# Patient Record
Sex: FEMALE | Race: WHITE | NOT HISPANIC OR LATINO | Employment: PART TIME | URBAN - METROPOLITAN AREA
[De-identification: names, ages, dates, MRNs, and addresses within clinical notes are randomized per-mention and may not be internally consistent; named-entity substitution may affect disease eponyms.]

---

## 2024-03-13 ENCOUNTER — APPOINTMENT (EMERGENCY)
Dept: RADIOLOGY | Facility: HOSPITAL | Age: 39
End: 2024-03-13
Payer: COMMERCIAL

## 2024-03-13 ENCOUNTER — HOSPITAL ENCOUNTER (OUTPATIENT)
Facility: HOSPITAL | Age: 39
Setting detail: OBSERVATION
Discharge: HOME | End: 2024-03-16
Attending: STUDENT IN AN ORGANIZED HEALTH CARE EDUCATION/TRAINING PROGRAM | Admitting: STUDENT IN AN ORGANIZED HEALTH CARE EDUCATION/TRAINING PROGRAM
Payer: COMMERCIAL

## 2024-03-13 ENCOUNTER — APPOINTMENT (EMERGENCY)
Dept: CT IMAGING | Facility: HOSPITAL | Age: 39
End: 2024-03-13
Payer: COMMERCIAL

## 2024-03-13 DIAGNOSIS — R56.9 SEIZURE-LIKE ACTIVITY (HCC): Primary | ICD-10-CM

## 2024-03-13 DIAGNOSIS — R55 SYNCOPE: ICD-10-CM

## 2024-03-13 PROBLEM — D72.829 LEUKOCYTOSIS: Status: ACTIVE | Noted: 2024-03-13

## 2024-03-13 PROBLEM — F32.A DEPRESSION: Chronic | Status: ACTIVE | Noted: 2024-03-13

## 2024-03-13 PROBLEM — E66.9 OBESITY: Chronic | Status: ACTIVE | Noted: 2024-03-13

## 2024-03-13 LAB
2HR DELTA HS TROPONIN: 11 NG/L
ALBUMIN SERPL BCP-MCNC: 4.1 G/DL (ref 3.5–5)
ALP SERPL-CCNC: 67 U/L (ref 34–104)
ALT SERPL W P-5'-P-CCNC: 20 U/L (ref 7–52)
ANION GAP SERPL CALCULATED.3IONS-SCNC: 9 MMOL/L (ref 4–13)
AST SERPL W P-5'-P-CCNC: 21 U/L (ref 13–39)
ATRIAL RATE: 113 BPM
BACTERIA UR QL AUTO: ABNORMAL /HPF
BASOPHILS # BLD AUTO: 0.06 THOUSANDS/ÂΜL (ref 0–0.1)
BASOPHILS NFR BLD AUTO: 1 % (ref 0–1)
BILIRUB SERPL-MCNC: 0.64 MG/DL (ref 0.2–1)
BILIRUB UR QL STRIP: NEGATIVE
BUN SERPL-MCNC: 14 MG/DL (ref 5–25)
CALCIUM SERPL-MCNC: 9 MG/DL (ref 8.4–10.2)
CARDIAC TROPONIN I PNL SERPL HS: 24 NG/L
CARDIAC TROPONIN I PNL SERPL HS: 35 NG/L
CHLORIDE SERPL-SCNC: 105 MMOL/L (ref 96–108)
CLARITY UR: CLEAR
CO2 SERPL-SCNC: 24 MMOL/L (ref 21–32)
COLOR UR: YELLOW
CREAT SERPL-MCNC: 0.89 MG/DL (ref 0.6–1.3)
D DIMER PPP FEU-MCNC: 0.41 UG/ML FEU
EOSINOPHIL # BLD AUTO: 0.22 THOUSAND/ÂΜL (ref 0–0.61)
EOSINOPHIL NFR BLD AUTO: 2 % (ref 0–6)
ERYTHROCYTE [DISTWIDTH] IN BLOOD BY AUTOMATED COUNT: 13.2 % (ref 11.6–15.1)
EXT PREGNANCY TEST URINE: NEGATIVE
FLUAV RNA RESP QL NAA+PROBE: NEGATIVE
FLUBV RNA RESP QL NAA+PROBE: NEGATIVE
GFR SERPL CREATININE-BSD FRML MDRD: 80 ML/MIN/1.73SQ M
GLUCOSE SERPL-MCNC: 105 MG/DL (ref 65–140)
GLUCOSE UR STRIP-MCNC: NEGATIVE MG/DL
HCT VFR BLD AUTO: 40.5 % (ref 34.8–46.1)
HGB BLD-MCNC: 13.6 G/DL (ref 11.5–15.4)
HGB UR QL STRIP.AUTO: NEGATIVE
HYALINE CASTS #/AREA URNS LPF: ABNORMAL /LPF
IMM GRANULOCYTES # BLD AUTO: 0.06 THOUSAND/UL (ref 0–0.2)
IMM GRANULOCYTES NFR BLD AUTO: 1 % (ref 0–2)
KETONES UR STRIP-MCNC: ABNORMAL MG/DL
LEUKOCYTE ESTERASE UR QL STRIP: NEGATIVE
LYMPHOCYTES # BLD AUTO: 2.12 THOUSANDS/ÂΜL (ref 0.6–4.47)
LYMPHOCYTES NFR BLD AUTO: 17 % (ref 14–44)
MCH RBC QN AUTO: 30.4 PG (ref 26.8–34.3)
MCHC RBC AUTO-ENTMCNC: 33.6 G/DL (ref 31.4–37.4)
MCV RBC AUTO: 91 FL (ref 82–98)
MONOCYTES # BLD AUTO: 0.86 THOUSAND/ÂΜL (ref 0.17–1.22)
MONOCYTES NFR BLD AUTO: 7 % (ref 4–12)
MUCOUS THREADS UR QL AUTO: ABNORMAL
NEUTROPHILS # BLD AUTO: 8.89 THOUSANDS/ÂΜL (ref 1.85–7.62)
NEUTS SEG NFR BLD AUTO: 72 % (ref 43–75)
NITRITE UR QL STRIP: NEGATIVE
NON-SQ EPI CELLS URNS QL MICRO: ABNORMAL /HPF
NRBC BLD AUTO-RTO: 0 /100 WBCS
P AXIS: 47 DEGREES
PH UR STRIP.AUTO: 5.5 [PH]
PLATELET # BLD AUTO: 304 THOUSANDS/UL (ref 149–390)
PMV BLD AUTO: 9.5 FL (ref 8.9–12.7)
POTASSIUM SERPL-SCNC: 3.8 MMOL/L (ref 3.5–5.3)
PR INTERVAL: 138 MS
PROT SERPL-MCNC: 7.5 G/DL (ref 6.4–8.4)
PROT UR STRIP-MCNC: ABNORMAL MG/DL
QRS AXIS: 61 DEGREES
QRSD INTERVAL: 78 MS
QT INTERVAL: 314 MS
QTC INTERVAL: 430 MS
RBC # BLD AUTO: 4.47 MILLION/UL (ref 3.81–5.12)
RBC #/AREA URNS AUTO: ABNORMAL /HPF
RSV RNA RESP QL NAA+PROBE: NEGATIVE
SARS-COV-2 RNA RESP QL NAA+PROBE: NEGATIVE
SODIUM SERPL-SCNC: 138 MMOL/L (ref 135–147)
SP GR UR STRIP.AUTO: 1.02 (ref 1–1.03)
T WAVE AXIS: 2 DEGREES
UROBILINOGEN UR STRIP-ACNC: <2 MG/DL
VENTRICULAR RATE: 113 BPM
WBC # BLD AUTO: 12.21 THOUSAND/UL (ref 4.31–10.16)
WBC #/AREA URNS AUTO: ABNORMAL /HPF

## 2024-03-13 PROCEDURE — 85025 COMPLETE CBC W/AUTO DIFF WBC: CPT

## 2024-03-13 PROCEDURE — 99285 EMERGENCY DEPT VISIT HI MDM: CPT | Performed by: STUDENT IN AN ORGANIZED HEALTH CARE EDUCATION/TRAINING PROGRAM

## 2024-03-13 PROCEDURE — 96360 HYDRATION IV INFUSION INIT: CPT

## 2024-03-13 PROCEDURE — 0241U HB NFCT DS VIR RESP RNA 4 TRGT: CPT

## 2024-03-13 PROCEDURE — 99285 EMERGENCY DEPT VISIT HI MDM: CPT

## 2024-03-13 PROCEDURE — 70450 CT HEAD/BRAIN W/O DYE: CPT

## 2024-03-13 PROCEDURE — 71045 X-RAY EXAM CHEST 1 VIEW: CPT

## 2024-03-13 PROCEDURE — 36415 COLL VENOUS BLD VENIPUNCTURE: CPT

## 2024-03-13 PROCEDURE — 81001 URINALYSIS AUTO W/SCOPE: CPT

## 2024-03-13 PROCEDURE — 93010 ELECTROCARDIOGRAM REPORT: CPT | Performed by: INTERNAL MEDICINE

## 2024-03-13 PROCEDURE — 80053 COMPREHEN METABOLIC PANEL: CPT

## 2024-03-13 PROCEDURE — 99222 1ST HOSP IP/OBS MODERATE 55: CPT | Performed by: STUDENT IN AN ORGANIZED HEALTH CARE EDUCATION/TRAINING PROGRAM

## 2024-03-13 PROCEDURE — 84484 ASSAY OF TROPONIN QUANT: CPT

## 2024-03-13 PROCEDURE — 85379 FIBRIN DEGRADATION QUANT: CPT

## 2024-03-13 PROCEDURE — 81025 URINE PREGNANCY TEST: CPT

## 2024-03-13 PROCEDURE — 93005 ELECTROCARDIOGRAM TRACING: CPT

## 2024-03-13 RX ORDER — ALPRAZOLAM 0.5 MG/1
0.5 TABLET ORAL
COMMUNITY

## 2024-03-13 RX ORDER — ACETAMINOPHEN 325 MG/1
650 TABLET ORAL EVERY 6 HOURS PRN
Status: DISCONTINUED | OUTPATIENT
Start: 2024-03-13 | End: 2024-03-16 | Stop reason: HOSPADM

## 2024-03-13 RX ORDER — SUMATRIPTAN 50 MG/1
50 TABLET, FILM COATED ORAL ONCE AS NEEDED
COMMUNITY

## 2024-03-13 RX ORDER — PANTOPRAZOLE SODIUM 40 MG/1
40 TABLET, DELAYED RELEASE ORAL
Status: DISCONTINUED | OUTPATIENT
Start: 2024-03-13 | End: 2024-03-16 | Stop reason: HOSPADM

## 2024-03-13 RX ORDER — LORAZEPAM 2 MG/ML
2 INJECTION INTRAMUSCULAR EVERY 6 HOURS PRN
Status: DISCONTINUED | OUTPATIENT
Start: 2024-03-13 | End: 2024-03-16 | Stop reason: HOSPADM

## 2024-03-13 RX ORDER — SUMATRIPTAN 25 MG/1
50 TABLET, FILM COATED ORAL ONCE AS NEEDED
Status: DISCONTINUED | OUTPATIENT
Start: 2024-03-13 | End: 2024-03-16 | Stop reason: HOSPADM

## 2024-03-13 RX ORDER — OMEPRAZOLE 20 MG/1
20 CAPSULE, DELAYED RELEASE ORAL
COMMUNITY

## 2024-03-13 RX ORDER — POLYETHYLENE GLYCOL 3350 17 G/17G
17 POWDER, FOR SOLUTION ORAL DAILY
Status: DISCONTINUED | OUTPATIENT
Start: 2024-03-14 | End: 2024-03-16 | Stop reason: HOSPADM

## 2024-03-13 RX ORDER — PANTOPRAZOLE SODIUM 40 MG/1
40 TABLET, DELAYED RELEASE ORAL
Status: DISCONTINUED | OUTPATIENT
Start: 2024-03-14 | End: 2024-03-13

## 2024-03-13 RX ORDER — ALPRAZOLAM 0.5 MG/1
0.5 TABLET ORAL
Status: DISCONTINUED | OUTPATIENT
Start: 2024-03-13 | End: 2024-03-16 | Stop reason: HOSPADM

## 2024-03-13 RX ORDER — ONDANSETRON 2 MG/ML
4 INJECTION INTRAMUSCULAR; INTRAVENOUS EVERY 6 HOURS PRN
Status: DISCONTINUED | OUTPATIENT
Start: 2024-03-13 | End: 2024-03-16 | Stop reason: HOSPADM

## 2024-03-13 RX ORDER — MOMETASONE FUROATE 1 MG/G
0.1 CREAM TOPICAL DAILY PRN
COMMUNITY

## 2024-03-13 RX ADMIN — SERTRALINE HYDROCHLORIDE 50 MG: 50 TABLET ORAL at 22:58

## 2024-03-13 RX ADMIN — SODIUM CHLORIDE 1000 ML: 0.9 INJECTION, SOLUTION INTRAVENOUS at 20:07

## 2024-03-13 RX ADMIN — ALPRAZOLAM 0.5 MG: 0.5 TABLET ORAL at 22:58

## 2024-03-13 NOTE — ED ATTENDING ATTESTATION
3/13/2024  I, Petey Faria DO, saw and evaluated the patient. I have discussed the patient with the resident/non-physician practitioner and agree with the resident's/non-physician practitioner's findings, Plan of Care, and MDM as documented in the resident's/non-physician practitioner's note, except where noted. All available labs and Radiology studies were reviewed.  I was present for key portions of any procedure(s) performed by the resident/non-physician practitioner and I was immediately available to provide assistance.       At this point I agree with the current assessment done in the Emergency Department.  I have conducted an independent evaluation of this patient a history and physical is as follows:    39-year-old female presents to the ED for evaluation of possible syncope versus seizure.  Patient states that last thing she remembers is that she was cooking food at home,  She remembers is police/EMS/family surrounding her and checking on her.  Family state that she had convulsive motions of her entire body lasting perhaps 2 minutes or so.  She denies previous episodes of similar symptoms.  No recent illness.  No recent changes in medications.  No known inciting event.  No falls or traumas.  Given description of symptoms, anticipate at the very least a ground-level fall.  On exam, is resting comfortably no acute distress, speaking full sentences with no respiratory distress, pulse is regular with normal rate, cranial nerves II through XII are grossly intact, moving all extremities with no gross motor deficit.  Is noted to be tachycardic on arrival.  EKG is nonischemic as interpreted by myself, appears to be a sinus rhythm.  Labs show leukocytosis 12.21, stable hemoglobin, no acute electrolyte abnormalities, normal renal function, no acute LFT abnormalities, negative D-dimer, indeterminate troponin at 24.  Chest x-ray shows no acute cardiopulmonary pathology as interpreted by myself pending final radiology  read.  CT head shows no acute intracranial pathology.  Patient was given fluid bolus.  Results discussed.  Given syncope versus seizure, plan will be for hospitalization.  Patient is agreeable to the plan.  Resident consulted the admitting team.  Accepted onto their service for further care and management.  Observation MedSurg with telemetry.  Stable at the time of disposition    ED Course         Critical Care Time  Procedures

## 2024-03-13 NOTE — LETTER
[facility] 3RD  FLOOR MED SURG UNIT  [address]  [place] [address]  Dept: [phone]    March 16, 2024     Patient: Nenita Lewis   YOB: 1985   Date of Visit: 3/13/2024       To Whom it May Concern:    Nenita Lewis is under my professional care. She was seen in the hospital from 3/13/2024 to 03/16/24. She may return to work on 3/20/2024 without limitations.    If you have any questions or concerns, please don't hesitate to call.         Sincerely,          Ruth Ayala MD

## 2024-03-13 NOTE — ED PROVIDER NOTES
History  Chief Complaint   Patient presents with    Syncope     Pt coming from home, had one syncopal episode in kitchen, unwitnessed. Son reported seizure like activity for about 2 minutes. No thinners/aspirin     The patient is a 39 year old female who presents to ED via EMS for evaluation of syncope vs seizure. The pt states she was making dinner tonight and then the next thing she remembers she was on the floor with her son over her. She states she currently feels mentally slow ad grodggy and has trouble remembering the day. The pt's sone was at home and states he was in the next room and heard his mom fall and he found her on the kitchen floor unconscious and shaking and drooling. He states the episode lasted about 2-3 minutes. The pt state        None       Past Medical History:   Diagnosis Date    Seizure (HCC)        Past Surgical History:   Procedure Laterality Date     SECTION         History reviewed. No pertinent family history.  I have reviewed and agree with the history as documented.    E-Cigarette/Vaping     E-Cigarette/Vaping Substances           Review of Systems   Constitutional:  Negative for activity change, appetite change, chills and fever.   HENT:  Negative for congestion and sore throat.    Eyes:  Negative for photophobia, discharge, redness and visual disturbance.   Respiratory:  Negative for chest tightness and shortness of breath.    Cardiovascular:  Negative for chest pain, palpitations and leg swelling.   Gastrointestinal:  Negative for abdominal pain, nausea and vomiting.   Genitourinary:  Negative for difficulty urinating, dysuria, flank pain, hematuria and urgency.   Musculoskeletal:  Negative for back pain.   Skin:  Negative for rash and wound.   Neurological:  Positive for tremors and syncope. Negative for dizziness, light-headedness and headaches.   Psychiatric/Behavioral:  Positive for confusion and decreased concentration.        Physical Exam  ED Triage Vitals    Temperature Pulse Respirations Blood Pressure SpO2   03/13/24 1918 03/13/24 1907 03/13/24 1907 03/13/24 1907 03/13/24 1907   98.5 °F (36.9 °C) (!) 118 20 133/84 93 %      Temp Source Heart Rate Source Patient Position - Orthostatic VS BP Location FiO2 (%)   03/13/24 1918 03/13/24 1907 03/13/24 1907 03/13/24 1907 --   Oral Monitor Sitting Left arm       Pain Score       --                    Orthostatic Vital Signs  Vitals:    03/13/24 1907   BP: 133/84   Pulse: (!) 118   Patient Position - Orthostatic VS: Sitting       Physical Exam  Vitals and nursing note reviewed.   Constitutional:       Appearance: Normal appearance. She is not ill-appearing.   HENT:      Head: Normocephalic and atraumatic.      Nose: Nose normal.      Mouth/Throat:      Mouth: Mucous membranes are moist.      Pharynx: Oropharynx is clear.   Eyes:      Pupils: Pupils are equal, round, and reactive to light.   Cardiovascular:      Rate and Rhythm: Regular rhythm. Tachycardia present.      Pulses: Normal pulses.      Heart sounds: Normal heart sounds.   Pulmonary:      Effort: Pulmonary effort is normal. No respiratory distress.      Breath sounds: Normal breath sounds. No wheezing, rhonchi or rales.   Abdominal:      General: Abdomen is flat. Bowel sounds are normal.      Palpations: Abdomen is soft.      Tenderness: There is no abdominal tenderness. There is no right CVA tenderness, left CVA tenderness, guarding or rebound.   Musculoskeletal:         General: No swelling, tenderness, deformity or signs of injury. Normal range of motion.      Cervical back: Normal range of motion and neck supple.      Right lower leg: No edema.      Left lower leg: No edema.   Skin:     General: Skin is warm and dry.      Findings: No erythema, lesion or rash.   Neurological:      General: No focal deficit present.      Mental Status: She is alert.      Cranial Nerves: No cranial nerve deficit.      Sensory: No sensory deficit.      Motor: No weakness.    Psychiatric:         Behavior: Behavior normal.         Judgment: Judgment normal.         ED Medications  Medications   sodium chloride 0.9 % bolus 1,000 mL (1,000 mL Intravenous New Bag 3/13/24 2007)       Diagnostic Studies  Results Reviewed       Procedure Component Value Units Date/Time    HS Troponin 0hr (reflex protocol) [[id number]]  (Normal) Collected: 03/13/24 2004    Lab Status: Final result Specimen: Blood from Arm, Right Updated: 03/13/24 2055     hs TnI 0hr 24 ng/L     HS Troponin I 2hr [[id number]]     Lab Status: No result Specimen: Blood     HS Troponin I 4hr [[id number]]     Lab Status: No result Specimen: Blood     Comprehensive metabolic panel [[id number]] Collected: 03/13/24 2004    Lab Status: Final result Specimen: Blood from Arm, Right Updated: 03/13/24 2053     Sodium 138 mmol/L      Potassium 3.8 mmol/L      Chloride 105 mmol/L      CO2 24 mmol/L      ANION GAP 9 mmol/L      BUN 14 mg/dL      Creatinine 0.89 mg/dL      Glucose 105 mg/dL      Calcium 9.0 mg/dL      AST 21 U/L      ALT 20 U/L      Alkaline Phosphatase 67 U/L      Total Protein 7.5 g/dL      Albumin 4.1 g/dL      Total Bilirubin 0.64 mg/dL      eGFR 80 ml/min/1.73sq m     Narrative:      National Kid[employer] Foundation guidelines for Chronic Kidney Disease (CKD):     Stage 1 with normal or high GFR (GFR > 90 mL/min/1.73 square meters)    Stage 2 Mild CKD (GFR = 60-89 mL/min/1.73 square meters)    Stage 3A Moderate CKD (GFR = 45-59 mL/min/1.73 square meters)    Stage 3B Moderate CKD (GFR = 30-44 mL/min/1.73 square meters)    Stage 4 Severe CKD (GFR = 15-29 mL/min/1.73 square meters)    Stage 5 End Stage CKD (GFR <15 mL/min/1.73 square meters)  Note: GFR calculation is accurate only with a steady state creatinine    D-Dimer [[id number]]  (Normal) Collected: 03/13/24 2004    Lab Status: Final result Specimen: Blood from Arm, Right Updated: 03/13/24 2048     D-Dimer, Quant 0.41 ug/ml FEU     CBC and differential [[id number]]   (Abnormal) Collected: 03/13/24 2004    Lab Status: Final result Specimen: Blood from Arm, Right Updated: 03/13/24 2045     WBC 12.21 Thousand/uL      RBC 4.47 Million/uL      Hemoglobin 13.6 g/dL      Hematocrit 40.5 %      MCV 91 fL      MCH 30.4 pg      MCHC 33.6 g/dL      RDW 13.2 %      MPV 9.5 fL      Platelets 304 Thousands/uL      nRBC 0 /100 WBCs      Neutrophils Relative 72 %      Immature Grans % 1 %      Lymphocytes Relative 17 %      Monocytes Relative 7 %      Eosinophils Relative 2 %      Basophils Relative 1 %      Neutrophils Absolute 8.89 Thousands/µL      Absolute Immature Grans 0.06 Thousand/uL      Absolute Lymphocytes 2.12 Thousands/µL      Absolute Monocytes 0.86 Thousand/µL      Eosinophils Absolute 0.22 Thousand/µL      Basophils Absolute 0.06 Thousands/µL     FLU/RSV/COVID - if FLU/RSV clinically relevant [[id number]] Collected: 03/13/24 2004    Lab Status: In process Specimen: Nares from Nose Updated: 03/13/24 2029    UA w Reflex to Microscopic w Reflex to Culture [[id number]]     Lab Status: No result Specimen: Urine     POCT pregnancy, urine [[id number]]     Lab Status: No result                    CT head without contrast   Final Result by Chao Street MD (03/13 2005)      No acute intracranial hemorrhage, mass effect or edema.                  Workstation performed: [id number]         XR chest 1 view portable    (Results Pending)         Procedures  ECG 12 Lead Documentation Only    Date/Time: 3/13/2024 7:19 PM    Performed by: Tricia Singer MD  Authorized by: Tricia Signer MD    ECG reviewed by me, the ED Provider: yes    Patient location:  ED  Previous ECG:     Previous ECG:  Unavailable  Interpretation:     Interpretation: abnormal    Rate:     ECG rate:  113    ECG rate assessment: tachycardic    Rhythm:     Rhythm: sinus tachycardia    Ectopy:     Ectopy: none    QRS:     QRS axis:  Normal    QRS intervals:  Normal  Conduction:     Conduction: normal    ST  segments:     ST segments:  Normal  T waves:     T waves: normal          ED Course  ED Course as of 03/13/24 2111   Wed Mar 13, 2024   2042 CT head without contrast  IMPRESSION:     No acute intracranial hemorrhage, mass effect or edema.   2042 Recheck of pt, she is resting in room with family and talking to mom on cell phone. Mom  reports he pt had seizures at age 1 and was on phenobarbitol from age 1-7. No sz since then.    2049 D-Dimer, Quant: 0.41  WNL   2051 CBC and differential(!)  Leukocytosis but otherwise grossly normal   2055 Comprehensive metabolic panel  Completely normal   2056 Pt updated on head ct and labs so far along with plan to admit for observation. She is agreeable to plan.                                        Medical Decision Making  Ddx: Syncope, seizure, electrolyte abnormality, dehydration, cardiac arrhythmia, brain aneurysm, brain tumor, PE    CT head negative  D-dimer negative  CXR unremarkable.  EKG with tachycardia.   Electrolytes WNL  Initial troponin at 24.   Mild leukocytosis on CBC  UA pending results.   Admit to [facility] service observation on telemetry in stable condition.     Amount and/or Complexity of Data Reviewed  Independent Historian:      Details: Son  Labs: ordered. Decision-making details documented in ED Course.  Radiology: ordered. Decision-making details documented in ED Course.          Disposition  Final diagnoses:   Seizure-like activity (HCC)   Syncope     Time reflects when diagnosis was documented in both MDM as applicable and the Disposition within this note       Time User Action Codes Description Comment    3/13/2024  8:01 PM Petey Faria Add [R56.9] Seizure-like activity (HCC)     3/13/2024  8:01 PM Petey Faria Add [R55] Syncope           ED Disposition       ED Disposition   Admit    Condition   Stable    Date/Time   Wed Mar 13, 2024  9:09 PM    Comment   Case was discussed with [facility] and the patient's admission status was agreed to be Admission Status:  observation status to the service of Dr. Doan .               Follow-up Information    None         Patient's Medications    No medications on file     No discharge procedures on file.    PDMP Review       None             ED Provider  Attending physically available and evaluated Nenita Lewis. I managed the patient along with the ED Attending.    Electronically Signed by           Tricia Singer MD  03/13/24 5707

## 2024-03-14 ENCOUNTER — APPOINTMENT (OUTPATIENT)
Dept: NON INVASIVE DIAGNOSTICS | Facility: HOSPITAL | Age: 39
End: 2024-03-14
Payer: COMMERCIAL

## 2024-03-14 PROBLEM — Z86.69 HX OF MIGRAINES: Status: ACTIVE | Noted: 2024-03-14

## 2024-03-14 LAB
4HR DELTA HS TROPONIN: 7 NG/L
AORTIC ROOT: 3.1 CM
APICAL FOUR CHAMBER EJECTION FRACTION: 56 %
ASCENDING AORTA: 3.4 CM
AV LVOT MEAN GRADIENT: 1 MMHG
AV LVOT PEAK GRADIENT: 3 MMHG
AV PEAK GRADIENT: 9 MMHG
BASOPHILS # BLD AUTO: 0.05 THOUSANDS/ÂΜL (ref 0–0.1)
BASOPHILS NFR BLD AUTO: 1 % (ref 0–1)
CARDIAC TROPONIN I PNL SERPL HS: 31 NG/L
DOP CALC LVOT PEAK VEL VTI: 18.19 CM
DOP CALC LVOT PEAK VEL: 0.82 M/S
E WAVE DECELERATION TIME: 190 MS
E/A RATIO: 1.59
EOSINOPHIL # BLD AUTO: 0.1 THOUSAND/ÂΜL (ref 0–0.61)
EOSINOPHIL NFR BLD AUTO: 1 % (ref 0–6)
ERYTHROCYTE [DISTWIDTH] IN BLOOD BY AUTOMATED COUNT: 13.5 % (ref 11.6–15.1)
FRACTIONAL SHORTENING: 29 (ref 28–44)
HCT VFR BLD AUTO: 37.6 % (ref 34.8–46.1)
HGB BLD-MCNC: 12.6 G/DL (ref 11.5–15.4)
IMM GRANULOCYTES # BLD AUTO: 0.02 THOUSAND/UL (ref 0–0.2)
IMM GRANULOCYTES NFR BLD AUTO: 0 % (ref 0–2)
INTERVENTRICULAR SEPTUM IN DIASTOLE (PARASTERNAL SHORT AXIS VIEW): 0.8 CM
INTERVENTRICULAR SEPTUM: 0.8 CM (ref 0.6–1.1)
LAAS-AP2: 13.5 CM2
LAAS-AP4: 12.2 CM2
LEFT ATRIUM AREA SYSTOLE SINGLE PLANE A4C: 12.6 CM2
LEFT ATRIUM SIZE: 3.4 CM
LEFT ATRIUM VOLUME (MOD BIPLANE): 32 ML
LEFT INTERNAL DIMENSION IN SYSTOLE: 3.7 CM (ref 2.1–4)
LEFT VENTRICULAR INTERNAL DIMENSION IN DIASTOLE: 5.2 CM (ref 3.5–6)
LEFT VENTRICULAR POSTERIOR WALL IN END DIASTOLE: 0.9 CM
LEFT VENTRICULAR STROKE VOLUME: 73 ML
LVSV (TEICH): 73 ML
LYMPHOCYTES # BLD AUTO: 3.18 THOUSANDS/ÂΜL (ref 0.6–4.47)
LYMPHOCYTES NFR BLD AUTO: 34 % (ref 14–44)
MCH RBC QN AUTO: 31 PG (ref 26.8–34.3)
MCHC RBC AUTO-ENTMCNC: 33.5 G/DL (ref 31.4–37.4)
MCV RBC AUTO: 93 FL (ref 82–98)
MONOCYTES # BLD AUTO: 0.82 THOUSAND/ÂΜL (ref 0.17–1.22)
MONOCYTES NFR BLD AUTO: 9 % (ref 4–12)
MV E'TISSUE VEL-SEP: 14 CM/S
MV PEAK A VEL: 0.59 M/S
MV PEAK E VEL: 94 CM/S
MV STENOSIS PRESSURE HALF TIME: 55 MS
MV VALVE AREA P 1/2 METHOD: 4
NEUTROPHILS # BLD AUTO: 5.24 THOUSANDS/ÂΜL (ref 1.85–7.62)
NEUTS SEG NFR BLD AUTO: 55 % (ref 43–75)
NRBC BLD AUTO-RTO: 0 /100 WBCS
PLATELET # BLD AUTO: 261 THOUSANDS/UL (ref 149–390)
PMV BLD AUTO: 9.1 FL (ref 8.9–12.7)
RBC # BLD AUTO: 4.06 MILLION/UL (ref 3.81–5.12)
RIGHT ATRIAL 2D VOLUME: 26 ML
RIGHT ATRIUM AREA SYSTOLE A4C: 11.3 CM2
RIGHT VENTRICLE ID DIMENSION: 3.3 CM
SL CV LEFT ATRIUM LENGTH A2C: 4.4 CM
SL CV LV EF: 55
SL CV PED ECHO LEFT VENTRICLE DIASTOLIC VOLUME (MOD BIPLANE) 2D: 131 ML
SL CV PED ECHO LEFT VENTRICLE SYSTOLIC VOLUME (MOD BIPLANE) 2D: 58 ML
TRICUSPID ANNULAR PLANE SYSTOLIC EXCURSION: 2.1 CM
WBC # BLD AUTO: 9.41 THOUSAND/UL (ref 4.31–10.16)

## 2024-03-14 PROCEDURE — 93306 TTE W/DOPPLER COMPLETE: CPT | Performed by: INTERNAL MEDICINE

## 2024-03-14 PROCEDURE — 36415 COLL VENOUS BLD VENIPUNCTURE: CPT | Performed by: STUDENT IN AN ORGANIZED HEALTH CARE EDUCATION/TRAINING PROGRAM

## 2024-03-14 PROCEDURE — 99245 OFF/OP CONSLTJ NEW/EST HI 55: CPT | Performed by: PSYCHIATRY & NEUROLOGY

## 2024-03-14 PROCEDURE — 84484 ASSAY OF TROPONIN QUANT: CPT | Performed by: STUDENT IN AN ORGANIZED HEALTH CARE EDUCATION/TRAINING PROGRAM

## 2024-03-14 PROCEDURE — 93306 TTE W/DOPPLER COMPLETE: CPT

## 2024-03-14 PROCEDURE — 85025 COMPLETE CBC W/AUTO DIFF WBC: CPT | Performed by: STUDENT IN AN ORGANIZED HEALTH CARE EDUCATION/TRAINING PROGRAM

## 2024-03-14 PROCEDURE — 99233 SBSQ HOSP IP/OBS HIGH 50: CPT | Performed by: INTERNAL MEDICINE

## 2024-03-14 RX ORDER — LORAZEPAM 2 MG/ML
1 INJECTION INTRAMUSCULAR
Status: COMPLETED | OUTPATIENT
Start: 2024-03-14 | End: 2024-03-15

## 2024-03-14 RX ADMIN — PANTOPRAZOLE SODIUM 40 MG: 40 TABLET, DELAYED RELEASE ORAL at 22:47

## 2024-03-14 RX ADMIN — ALPRAZOLAM 0.5 MG: 0.5 TABLET ORAL at 22:47

## 2024-03-14 RX ADMIN — SERTRALINE HYDROCHLORIDE 50 MG: 50 TABLET ORAL at 22:47

## 2024-03-14 RX ADMIN — ACETAMINOPHEN 650 MG: 325 TABLET, FILM COATED ORAL at 15:33

## 2024-03-14 RX ADMIN — ACETAMINOPHEN 650 MG: 325 TABLET, FILM COATED ORAL at 08:26

## 2024-03-14 RX ADMIN — IBUPROFEN 400 MG: 100 SUSPENSION ORAL at 22:46

## 2024-03-14 RX ADMIN — PANTOPRAZOLE SODIUM 40 MG: 40 TABLET, DELAYED RELEASE ORAL at 00:20

## 2024-03-14 RX ADMIN — ONDANSETRON 4 MG: 2 INJECTION INTRAMUSCULAR; INTRAVENOUS at 01:27

## 2024-03-14 NOTE — ASSESSMENT & PLAN NOTE
Resolved, unknown etiology.  Recent Labs     03/13/24 2004 03/14/24  0642   WBC 12.21* 9.41     Patient remains afebrile.   No signs of active infections noted on physical exam.   Will monitor.

## 2024-03-14 NOTE — CONSULTS
Consultation - Neurology   Nenita Lewis 39 y.o. female MRN: [id number]  Unit/Bed#: ED-02 Encounter: [id number]      Assessment/Plan     * Syncope  Assessment & Plan  38 y/o female with depression, who presented after having a syncopal episode vs seizure-like activity. Patient reportedly lost consciousness and does not recall the event. Son witnessed the event and reported that patient was laying on the floor with whole body shaking, eyes opening and closing, and foaming at the mouth. Patient recalls waking up with EMS at her home but reports she was confused and denies any prodromal symptoms, tongue bite, or incontinence. BP on presentation 133/84. Patient now back to baseline and exam non focal. Unclear etiology at this time but cannot fully rule out seizure. Work up as noted below.    Work up:  -CT head: No acute intracranial hemorrhage, mass effect or edema.   -Labs: WBC on presentation 12.21, UA negative    Plan:  -MRI brain w/wo contrast seizure protocol   -Ativan PRN for pre-medication due to claustrophobia  -Routine EEG  -No need for AED at this time  -Ativan PRN for seizure activity > 2 minutes  -Seizure precautions  -[employer] form completed and submitted online. Patient aware she should stop driving at this time.  -Monitor neuro exam; notify with any changes  -Medical management and supportive care per primary team. Correction of any metabolic or infectious disturbances.       Recommendations for outpatient neurological follow up have yet to be determined.    Case and treatment plan reviewed with attending neurologist, Dr. Shah. Please see attending attestation for any further recommendations.    History of Present Illness     Reason for Consult / Principal Problem: Syncope vs seizure  HPI: Nenita Lewis is a 39 y.o.  female with depression, who presents with syncope.    Patient seen and evaluated at the bedside with attending neurologist. Patient reports she was in the kitchen and standing, making dinner  for her son. The next thing she remembers is waking up with EMS there. Son witnessed the event and reported to patient that he heard a bang and then saw her laying on the kitchen floor with her body shaking and her eyes were opening and closing with foaming at the mouth. She denies prodromal symptoms, tongue bite, or urinary incontinence. She reports being confused afterwards, although she was able to remember that she was making dinner and to ask EMS to turn the oven off. She reports having difficulty getting answers for simple questions in ED. She denies any recent illnesses or significant alcohol use. She has not had any new med changes or tobacco use. She reports a history of febrile seizures as a child. She reports being back to baseline but has a mild headache currently.    Inpatient consult to Neurology  Consult performed by: HANH Hdez  Consult ordered by: Ruth Ayala MD          Review of Systems  A 12 system ROS was completed. Other than the above mentioned complaints in the HPI and those commented on below, all remaining systems were negative.    Historical Information   Past Medical History:   Diagnosis Date    Seizure (HCC)      Past Surgical History:   Procedure Laterality Date     SECTION       Social History   Social History     Substance and Sexual Activity   Alcohol Use None     Social History     Substance and Sexual Activity   Drug Use Not on file     E-Cigarette/Vaping     E-Cigarette/Vaping Substances     Social History     Tobacco Use   Smoking Status Not on file   Smokeless Tobacco Not on file     Family History: History reviewed. No pertinent family history.    Review of previous medical records was completed.     Meds/Allergies   all current active meds have been reviewed, current meds:   Current Facility-Administered Medications   Medication Dose Route Frequency    acetaminophen (TYLENOL) tablet 650 mg  650 mg Oral Q6H PRN    ALPRAZolam (XANAX) tablet 0.5 mg  0.5 mg  Oral HS    LORazepam (ATIVAN) injection 1 mg  1 mg Intravenous Once in imaging    LORazepam (ATIVAN) injection 1 mg  1 mg Intravenous Once in imaging    LORazepam (ATIVAN) injection 2 mg  2 mg Intravenous Q6H PRN    ondansetron (ZOFRAN) injection 4 mg  4 mg Intravenous Q6H PRN    pantoprazole (PROTONIX) EC tablet 40 mg  40 mg Oral HS    polyethylene glycol (MIRALAX) packet 17 g  17 g Oral Daily    sertraline (ZOLOFT) tablet 50 mg  50 mg Oral HS    SUMAtriptan (IMITREX) tablet 50 mg  50 mg Oral Once PRN   , and PTA meds:   Prior to Admission Medications   Prescriptions Last Dose Informant Patient Reported? Taking?   ALPRAZolam (Xanax) 0.5 mg tablet 3/12/2024  Yes Yes   Sig: Take 0.5 mg by mouth daily at bedtime   SUMAtriptan (IMITREX) 50 mg tablet Past Month  Yes Yes   Sig: Take 50 mg by mouth once as needed for migraine   mometasone (ELOCON) 0.1 % cream Past Month  Yes Yes   Sig: Apply 0.1 Applications topically daily as needed (for rash flare up)   omeprazole (PriLOSEC) 20 mg delayed release capsule 3/12/2024  Yes Yes   Sig: Take 20 mg by mouth daily at bedtime   sertraline (Zoloft) 50 mg tablet 3/12/2024  Yes Yes   Sig: Take 50 mg by mouth daily at bedtime      Facility-Administered Medications: None       Allergies   Allergen Reactions    Latex Rash       Objective   Vitals:Blood pressure 127/65, pulse 80, temperature 98.5 °F (36.9 °C), temperature source Oral, resp. rate 18, weight 103 kg (226 lb 10.1 oz), SpO2 97%.,There is no height or weight on file to calculate BMI.  No intake or output data in the 24 hours ending 03/14/24 1142    Invasive Devices:   Invasive Devices       Peripheral Intravenous Line  Duration             Peripheral IV 03/13/24 Left Antecubital <1 day                    Physical and neurologic exam performed by attending neurologist:  Physical Exam  Vitals and nursing note reviewed.   Constitutional:       General: She is not in acute distress.     Appearance: Normal appearance. She is not  ill-appearing.   HENT:      Head: Normocephalic.      Mouth/Throat:      Mouth: Mucous membranes are moist.      Pharynx: Oropharynx is clear.   Eyes:      General: No scleral icterus.        Right eye: No discharge.         Left eye: No discharge.      Extraocular Movements: Extraocular movements intact and EOM normal.      Conjunctiva/sclera: Conjunctivae normal.   Cardiovascular:      Rate and Rhythm: Normal rate.   Pulmonary:      Effort: Pulmonary effort is normal. No respiratory distress.   Musculoskeletal:         General: Normal range of motion.   Skin:     General: Skin is warm and dry.      Coloration: Skin is not jaundiced or pale.   Neurological:      Mental Status: She is alert and oriented to person, place, and time.      Coordination: Finger-Nose-Finger Test normal.      Deep Tendon Reflexes:      Reflex Scores:       Bicep reflexes are 2+ on the right side and 2+ on the left side.       Patellar reflexes are 2+ on the right side and 2+ on the left side.  Psychiatric:         Mood and Affect: Mood normal.         Behavior: Behavior normal.       Neurologic Exam     Mental Status   Oriented to person, place, and time.   Level of consciousness: alert  Able to follow commands appropriately. No aphasia or dysarthria noted.     Cranial Nerves     CN II   Right visual field deficit: none  Left visual field deficit: none     CN III, IV, VI   Extraocular motions are normal.     CN V   Facial sensation intact.     CN VII   Facial expression full, symmetric.     CN VIII   Hearing: intact    CN IX, X   Palate: symmetric    CN XII   CN XII normal.     Motor Exam   Muscle bulk: normal  Right arm pronator drift: absent  Left arm pronator drift: absent  Bilateral UE strength 5/5 deltoids, biceps, triceps, hand   Bilateral LE strength 5/5 hip flexion, knee flexion, knee extension, dorsiflexion, plantar flexion     Sensory Exam   Light touch normal.     Gait, Coordination, and Reflexes     Coordination   Finger to  "nose coordination: normal    Tremor   Resting tremor: absent  Intention tremor: absent    Reflexes   Right biceps: 2+  Left biceps: 2+  Right patellar: 2+  Left patellar: 2+      Lab Results: I have personally reviewed pertinent reports.  , CBC:   Results from last 7 days   Lab Units 03/14/24  0642 03/13/24 2004   WBC Thousand/uL 9.41 12.21*   RBC Million/uL 4.06 4.47   HEMOGLOBIN g/dL 12.6 13.6   HEMATOCRIT % 37.6 40.5   MCV fL 93 91   PLATELETS Thousands/uL 261 304   , BMP/CMP:   Results from last 7 days   Lab Units 03/13/24 2004   SODIUM mmol/L 138   POTASSIUM mmol/L 3.8   CHLORIDE mmol/L 105   CO2 mmol/L 24   BUN mg/dL 14   CREATININE mg/dL 0.89   CALCIUM mg/dL 9.0   AST U/L 21   ALT U/L 20   ALK PHOS U/L 67   EGFR ml/min/1.73sq m 80   , Vitamin B12:   , HgBA1C:   , TSH:   , Coagulation:   , Lipid Profile:   , Ammonia:   , Urinalysis:   Results from last 7 days   Lab Units 03/13/24  2203   COLOR UA  Yellow   CLARITY UA  Clear   SPEC GRAV UA  1.024   PH UA  5.5   LEUKOCYTES UA  Negative   NITRITE UA  Negative   GLUCOSE UA mg/dl Negative   KETONES UA mg/dl Trace*   BILIRUBIN UA  Negative   BLOOD UA  Negative   , Drug Screen:   , Medication Drug Levels:       Invalid input(s): \"CARBAMAZEPINE\", \"LACOSAMIDE\", \"OXCARBAZEPINE\"    Imaging Studies: I have personally reviewed pertinent reports.   and I have personally reviewed pertinent films in PACS  EKG, Pathology, and Other Studies: I have personally reviewed pertinent reports.      Code Status: Level 1 - Full Code  Advance Directive and Living Will:      Power of :    POLST:    "

## 2024-03-14 NOTE — PROGRESS NOTES
[facility]  Progress Note  Name: Nenita Lewis I  MRN: [id number]  Unit/Bed#: ED-02 I Date of Admission: 3/13/2024   Date of Service: 3/14/2024 I Hospital Day: 0    Assessment/Plan   * Syncope  Assessment & Plan  Likely orthostatic versus neurocardiogenic syncope, but cannot rule out seizure activity at this time.   Very less likely seizure disorder, despite witnessed body shakes.  No evidence of tongue bite, no evidence of bladder or bowel incontinence.  No history of seizure disorder  No prodromal symptoms.   CT head with no abnormality.  EKG with no evidence of ST-T wave ischemic changes, no bradycardia or tachyarrhythmia.  Orthostatic vitals negative  Patient currently at baseline on exam    Plan:  Echo, follow up  Consult neuro, appreciate recommendation  - Follow-up EEG and MRI  - [employer] form completed and submitted online       Hx of migraines  Assessment & Plan  Patient has a hx of migraines   - usually left-sided  No active migraine at this time.     Plan:  Continue Sumatriptan as needed    Depression  Assessment & Plan  Stable.   Continue with Zoloft.    Leukocytosis  Assessment & Plan  Resolved, unknown etiology.  Recent Labs     03/13/24  2004 03/14/24  0642   WBC 12.21* 9.41     Patient remains afebrile.   No signs of active infections noted on physical exam.   Will monitor.         VTE Pharmacologic Prophylaxis:   Low Risk (Score 0-2) - Encourage Ambulation.      Patient Centered Rounds: I performed bedside rounds with nursing staff today.  Discussions with Specialists or Other Care Team Provider: Case management, nursing, attending physician    Education and Discussions with Family / Patient: Attempted to update  (mother-in-law (Amelia)) via phone. Unable to contact.    Current Length of Stay: 0 day(s)  Current Patient Status: Observation   Discharge Plan: Anticipate discharge tomorrow to home.    Code Status: Level 1 - Full Code    Subjective:   40 yo female  sitting comfortably in bed. She is not in acute distress. No overnight events reported. She endorses that she does not fully remember the events of last night, but is feeling better today. She is alert and oriented today. No concerns at this time. She denies nausea, vomiting, palpitations, chest pain,  and shortness of breath. She does endorse a mild headache.    Objective:     Vitals:   Temp (24hrs), Av.5 °F (36.9 °C), Min:98.5 °F (36.9 °C), Max:98.5 °F (36.9 °C)    Temp:  [98.5 °F (36.9 °C)] 98.5 °F (36.9 °C)  HR:  [] 74  Resp:  [18-20] 18  BP: (107-155)/(56-88) 114/58  SpO2:  [93 %-98 %] 96 %  There is no height or weight on file to calculate BMI.     Input and Output Summary (last 24 hours):   No intake or output data in the 24 hours ending 24 1356    Physical Exam:   Physical Exam  Vitals reviewed.   Constitutional:       General: She is not in acute distress.     Appearance: Normal appearance. She is not ill-appearing.   HENT:      Head: Normocephalic and atraumatic.      Right Ear: External ear normal.      Left Ear: External ear normal.      Nose: Nose normal.      Mouth/Throat:      Mouth: Mucous membranes are moist.      Pharynx: Oropharynx is clear.   Eyes:      General:         Right eye: No discharge.         Left eye: No discharge.      Extraocular Movements: Extraocular movements intact.      Conjunctiva/sclera: Conjunctivae normal.      Pupils: Pupils are equal, round, and reactive to light.   Cardiovascular:      Rate and Rhythm: Normal rate and regular rhythm.      Pulses: Normal pulses.      Heart sounds: Normal heart sounds. No murmur heard.  Pulmonary:      Effort: Pulmonary effort is normal. No respiratory distress.      Breath sounds: Normal breath sounds. No wheezing, rhonchi or rales.   Abdominal:      Palpations: Abdomen is soft.      Tenderness: There is no abdominal tenderness.   Musculoskeletal:      Cervical back: Neck supple.      Right lower leg: No edema.      Left  lower leg: No edema.   Skin:     General: Skin is warm and dry.   Neurological:      General: No focal deficit present.      Mental Status: She is alert and oriented to person, place, and time.   Psychiatric:         Mood and Affect: Mood normal.         Behavior: Behavior normal.       Additional Data:     Labs:  Results from last 7 days   Lab Units 03/14/24  0642   WBC Thousand/uL 9.41   HEMOGLOBIN g/dL 12.6   HEMATOCRIT % 37.6   PLATELETS Thousands/uL 261   NEUTROS PCT % 55   LYMPHS PCT % 34   MONOS PCT % 9   EOS PCT % 1     Results from last 7 days   Lab Units 03/13/24 2004   SODIUM mmol/L 138   POTASSIUM mmol/L 3.8   CHLORIDE mmol/L 105   CO2 mmol/L 24   BUN mg/dL 14   CREATININE mg/dL 0.89   ANION GAP mmol/L 9   CALCIUM mg/dL 9.0   ALBUMIN g/dL 4.1   TOTAL BILIRUBIN mg/dL 0.64   ALK PHOS U/L 67   ALT U/L 20   AST U/L 21   GLUCOSE RANDOM mg/dL 105                     Lines/Drains:  Invasive Devices       Peripheral Intravenous Line  Duration             Peripheral IV 03/13/24 Left Antecubital <1 day                  Telemetry:  Telemetry Orders (From admission, onward)               24 Hour Telemetry Monitoring  Continuous x 24 Hours (Telem)        Question:  Reason for 24 Hour Telemetry  Answer:  Syncope suspected to be cardiac in origin                     Telemetry Reviewed: Normal Sinus Rhythm  Indication for Continued Telemetry Use: No indication for continued use. Will discontinue.          Imaging: Reviewed radiology reports from this admission including: CT head    Last 24 Hours Medication List:   Current Facility-Administered Medications   Medication Dose Route Frequency Provider Last Rate    acetaminophen  650 mg Oral Q6H PRN Lyndon Doan MD      ALPRAZolam  0.5 mg Oral HS Lyndon Doan MD      LORazepam  1 mg Intravenous Once in imaging An K Long, CRNP      LORazepam  1 mg Intravenous Once in imaging An K Long, CRNP      LORazepam  2 mg Intravenous Q6H PRN Lyndon Doan MD      ondansetron  4 mg  Intravenous Q6H PRN Lyndon Doan MD      pantoprazole  40 mg Oral HS Lyndon Doan MD      polyethylene glycol  17 g Oral Daily Lyndon Doan MD      sertraline  50 mg Oral HS Lyndon Doan MD      SUMAtriptan  50 mg Oral Once PRN Lyndon Doan MD          Today, Patient Was Seen By: Susana Moran DO    **Please Note: This note may have been constructed using a voice recognition system.**

## 2024-03-14 NOTE — ASSESSMENT & PLAN NOTE
Likely orthostatic versus neurocardiogenic syncope, but cannot rule out seizure activity at this time.   Very less likely seizure disorder, despite witnessed body shakes.  No evidence of tongue bite, no evidence of bladder or bowel incontinence.  No history of seizure disorder  No prodromal symptoms.   CT head with no abnormality.  EKG with no evidence of ST-T wave ischemic changes, no bradycardia or tachyarrhythmia.  Orthostatic vitals negative  Patient currently at baseline on exam    Plan:  Echo, follow up  Consult neuro, appreciate recommendation  - Follow-up EEG and MRI  - [employer] form completed and submitted online

## 2024-03-14 NOTE — ASSESSMENT & PLAN NOTE
38 y/o female with depression, who presented after having a syncopal episode vs seizure-like activity. Patient reportedly lost consciousness and does not recall the event. Son witnessed the event and reported that patient was laying on the floor with whole body shaking, eyes opening and closing, and foaming at the mouth. Patient recalls waking up with EMS at her home but reports she was confused and denies any prodromal symptoms, tongue bite, or incontinence. BP on presentation 133/84. Patient now back to baseline and exam non focal. Unclear etiology at this time but cannot fully rule out seizure. Work up as noted below.    Work up:  -CT head: No acute intracranial hemorrhage, mass effect or edema.   -Labs: WBC on presentation 12.21, UA negative    Plan:  -MRI brain w/wo contrast seizure protocol   -Ativan PRN for pre-medication due to claustrophobia  -Routine EEG  -No need for AED at this time  -Ativan PRN for seizure activity > 2 minutes  -Seizure precautions  -[employer] form completed and submitted online. Patient aware she should stop driving at this time.  -Monitor neuro exam; notify with any changes  -Medical management and supportive care per primary team. Correction of any metabolic or infectious disturbances.

## 2024-03-14 NOTE — UTILIZATION REVIEW
Initial Clinical Review    Admission: Date/Time/Statement:   Admission Orders (From admission, onward)       Ordered        03/13/24 2109  Place in Observation  Once                          Orders Placed This Encounter   Procedures    Place in Observation     Standing Status:   Standing     Number of Occurrences:   1     Order Specific Question:   Level of Care     Answer:   Med Surg [16]     Order Specific Question:   Bed request comments     Answer:   tele     ED Arrival Information       Expected   -    Arrival   3/13/2024 19:01    Acuity   Urgent              Means of arrival   Ambulance    Escorted by   [facility]    Service   Hospitalist    Admission type   Emergency              Arrival complaint   syncope             Chief Complaint   Patient presents with    Syncope     Pt coming from home, had one syncopal episode in kitchen, unwitnessed. Son reported seizure like activity for about 2 minutes. No thinners/aspirin       Initial Presentation: 39 y.o. female to ED by EMS as Observation admission due to Syncope. Presents  after her son saw her unconscious on the kitchen floor with abnormal movements and of frothy secretions from her mouth.  The patient does not remember any prodromal symptoms preceding her syncope.  She denied any blurred vision, double vision, palpitation, chest pain, dizziness, lightheadedness,  any tongue biting, bowel or bladder incontinence. No  history of adulthood seizure, she has a history of febrile seizure when she was a toddler, she denied any previous episode of syncope.  She was preparing dinner in the kitchen and the episode happened. After she gained her consciousness she was immediately aware of situation she knew where she was,, she had slight problem calling her son with his name, however she had normal sensorium.  EXAM CTH wo abnormality, leukocytosis  Cont tele, orthostatic vitals, no need for EEG, consult Neuro   Date: 3/14/2024   Day 2:   NEURO  Can not entirely rule  out a seizure, however overall presentation does seem suspicious of seizure, recommend MRI brain w/wo, and ReeG. D/w patient Arthur Dot and driving cessation. At this point no role to start of AEDs. Cont neuro checks, Ativan PRN for seizure activity > 2 minutes. Seizure precautions  DATE  3/15/2024  DAY 3  Cardiology  Sudden, without warning, no prodrome  Febrile seizures as a child  Prolonged minutes of witnessed tonic-clonic movement  Postictal state lasting until EMS arrived  Highly suggestive of seizure    ED Triage Vitals   Temperature Pulse Respirations Blood Pressure SpO2   03/13/24 1918 03/13/24 1907 03/13/24 1907 03/13/24 1907 03/13/24 1907   98.5 °F (36.9 °C) (!) 118 20 133/84 93 %      Temp Source Heart Rate Source Patient Position - Orthostatic VS BP Location FiO2 (%)   03/13/24 1918 03/13/24 1907 03/13/24 1907 03/13/24 1907 --   Oral Monitor Sitting Left arm       Pain Score       --                 Wt Readings from Last 1 Encounters:   03/13/24 103 kg (226 lb 10.1 oz)     Additional Vital Signs:   Date/Time Temp Pulse Resp BP MAP (mmHg) SpO2 O2 Device Patient Position - Orthostatic VS   03/14/24 0700 -- 66 18 107/56 74 98 % None (Room air) Lying   03/14/24 0400 -- 75 18 130/66 90 95 % None (Room air) Lying   03/14/24 0130 -- 81 -- 123/68 -- -- -- --   03/13/24 2232 -- 102 20 155/70 -- -- -- Standing for 3 minutes - Orthostatic VS   03/13/24 2229 -- 102 20 151/88 -- -- -- Standing - Orthostatic VS   03/13/24 2227 -- 101 20 141/69 -- -- -- Sitting - Orthostatic VS   03/13/24 2224 -- 104 20 141/74 -- -- -- Lying - Orthostatic VS   03/13/24 2004 -- -- -- -- -- -- None (Room air) --   03/13/24 1918 98.5 °F (36.9 °C) -- -- -- -- -- -- --   03/13/24 1907 -- 118 Abnormal  20 133/84 104 93 % None (Room air) Sitting     Weights (last 14 days)    Date/Time Weight Weight Method   03/13/24 1907 103 kg (226 lb 10.1 oz) Bed scale     Pertinent Labs/Diagnostic Test Results:   CT head without contrast   Final Result by  "Chao Street MD (03/13 2005)      No acute intracranial hemorrhage, mass effect or edema.                  Workstation performed: [id number]         XR chest 1 view portable   Final Result by Piedad Escamilla MD (03/13 2138)      No acute cardiopulmonary disease.            Workstation performed: [id number]           Results from last 7 days   Lab Units 03/13/24 2004   SARS-COV-2  Negative     Results from last 7 days   Lab Units 03/14/24  0642 03/13/24 2004   WBC Thousand/uL 9.41 12.21*   HEMOGLOBIN g/dL 12.6 13.6   HEMATOCRIT % 37.6 40.5   PLATELETS Thousands/uL 261 304   NEUTROS ABS Thousands/µL 5.24 8.89*         Results from last 7 days   Lab Units 03/13/24 2004   SODIUM mmol/L 138   POTASSIUM mmol/L 3.8   CHLORIDE mmol/L 105   CO2 mmol/L 24   ANION GAP mmol/L 9   BUN mg/dL 14   CREATININE mg/dL 0.89   EGFR ml/min/1.73sq m 80   CALCIUM mg/dL 9.0     Results from last 7 days   Lab Units 03/13/24 2004   AST U/L 21   ALT U/L 20   ALK PHOS U/L 67   TOTAL PROTEIN g/dL 7.5   ALBUMIN g/dL 4.1   TOTAL BILIRUBIN mg/dL 0.64         Results from last 7 days   Lab Units 03/13/24 2004   GLUCOSE RANDOM mg/dL 105             No results found for: \"BETA-HYDROXYBUTYRATE\"                   Results from last 7 days   Lab Units 03/14/24  0020 03/13/24  2203 03/13/24 2004   HS TNI 0HR ng/L  --   --  24   HS TNI 2HR ng/L  --  35  --    HSTNI D2 ng/L  --  11  --    HS TNI 4HR ng/L 31  --   --    HSTNI D4 ng/L 7  --   --      Results from last 7 days   Lab Units 03/13/24 2004   D-DIMER QUANTITATIVE ug/ml FEU 0.41                                                                 Results from last 7 days   Lab Units 03/13/24 2203   CLARITY UA  Clear   COLOR UA  Yellow   SPEC GRAV UA  1.024   PH UA  5.5   GLUCOSE UA mg/dl Negative   KETONES UA mg/dl Trace*   BLOOD UA  Negative   PROTEIN UA mg/dl 30 (1+)*   NITRITE UA  Negative   BILIRUBIN UA  Negative   UROBILINOGEN UA (BE) mg/dl <2.0   LEUKOCYTES UA  Negative   WBC UA " /hpf 2-4*   RBC UA /hpf 1-2   BACTERIA UA /hpf None Seen   EPITHELIAL CELLS WET PREP /hpf Occasional   MUCUS THREADS  Occasional*     Results from last 7 days   Lab Units 03/13/24 2004   INFLUENZA A PCR  Negative   INFLUENZA B PCR  Negative   RSV PCR  Negative           ED Treatment:   Medication Administration from 03/13/2024 1901 to 03/14/2024 0812         Date/Time Order Dose Route Action Action by Comments     03/14/2024 0431 EDT sodium chloride 0.9 % bolus 1,000 mL 0 mL Intravenous Stopped Alee Iacovone, RN --     03/13/2024 2007 EDT sodium chloride 0.9 % bolus 1,000 mL 1,000 mL Intravenous New Bag Jes Barton, RN --     03/14/2024 0127 EDT ondansetron (ZOFRAN) injection 4 mg 4 mg Intravenous Given Viviana Koorie, RN --     03/13/2024 2258 EDT ALPRAZolam (XANAX) tablet 0.5 mg 0.5 mg Oral Given Alee Iacovone, RN --     03/13/2024 2258 EDT sertraline (ZOLOFT) tablet 50 mg 50 mg Oral Given Alee Iacovone, RN --     03/14/2024 0020 EDT pantoprazole (PROTONIX) EC tablet 40 mg 40 mg Oral Given Alee Iacovone, RN --          Past Medical History:   Diagnosis Date    Seizure (HCC)      Present on Admission:   Syncope with normal neurologic examination      Admitting Diagnosis: No admission diagnoses are documented for this encounter.  Age/Sex: 39 y.o. female  Admission Orders:  Telemetry  Orthostatic vitals      Scheduled Medications:  ALPRAZolam, 0.5 mg, Oral, HS  pantoprazole, 40 mg, Oral, HS  polyethylene glycol, 17 g, Oral, Daily  sertraline, 50 mg, Oral, HS      Continuous IV Infusions:     PRN Meds:  acetaminophen, 650 mg, Oral, Q6H PRN  LORazepam, 2 mg, Intravenous, Q6H PRN  ondansetron, 4 mg, Intravenous, Q6H PRN  SUMAtriptan, 50 mg, Oral, Once PRN        None    Network Utilization Review Department  ATTENTION: Please call with any questions or concerns to [phone] and carefully listen to the prompts so that you are directed to the right person. All voicemails are confidential.   For Discharge  needs, contact Care Management DC Support Team at [phone] opt. 2  Send all requests for admission clinical reviews, approved or denied determinations and any other requests to dedicated fax number below belonging to the campus where the patient is receiving treatment. List of dedicated fax numbers for the Facilities:  FACILITY NAME UR FAX NUMBER   ADMISSION DENIALS (Administrative/Medical Necessity) [phone]   DISCHARGE SUPPORT TEAM (NETWORK) [phone]   PARENT CHILD HEALTH (Maternity/NICU/Pediatrics) [phone]   [facility] [phone]   [facility] [phone]   [facility] [phone]   [facility] [phone]   [facility] [phone]   [facility] [phone]   [facility] [phone]   [facility] [phone]   [facility] [phone]   [facility] [phone]   [facility] [phone]   [facility] [phone]

## 2024-03-14 NOTE — H&P
[facility]  H&P  Name: Nenita Lewis 39 y.o. female I MRN: [id number]  Unit/Bed#: ED-02 I Date of Admission: 3/13/2024   Date of Service: 3/13/2024 I Hospital Day: 0      Assessment/Plan   * Syncope with normal neurologic examination  Assessment & Plan  likely orthostatic versus neurocardiogenic syncope.  Very less likely seizure disorder, despite witnessed body shakes.  No evidence of tongue bite, no evidence of bladder or bowel incontinence.  No history of seizure.  CT head with no abnormality.  EKG with no evidence of ST-T wave ischemic changes, no bradycardia or tachyarrhythmia.  Check orthostatic vitals.  If negative the patient can be discharged in the morning after reviewing her telemetry monitor to ensure no evidence of tacky or bradycardia arrhythmia that may be contributing to syncope.  No need for EEG at this time.    Leukocytosis  Assessment & Plan  Unspecific, unknown etiology.  Will monitor.    Obesity  Assessment & Plan  Outpatient management and follow-up.    Depression  Assessment & Plan  Continue with Zoloft.             VTE Prophylaxis: No need patient is low risk   Code Status: Full code  POLST: POLST form is not discussed and not completed at this time.  Discussion with family: No family at bedside    Anticipated Length of Stay:  Patient will be admitted on an Observation basis with an anticipated length of stay of less than 2 midnights.   Justification for Hospital Stay: Syncopal workup and telemetry monitoring    Total Time for Visit, including Counseling / Coordination of Care: 30 minutes.  Greater than 50% of this total time spent on direct patient counseling and coordination of care.    Chief Complaint:   Syncope    History of Present Illness:    Nenita Lewis is a 39 y.o. female with PMH of depression, presented to the hospital by EMS after her son saw her unconscious on the kitchen floor with abnormal movements and of frothy secretions from her mouth.  The  patient does not remember any prodromal symptoms preceding her syncope.  She denied any blurred vision, double vision, palpitation, chest pain, dizziness, lightheadedness.  She denied any tongue biting, bowel or bladder incontinence, she does not have a history of adulthood seizure, she has a history of febrile seizure when she was a toddler, she denied any previous episode of syncope.  The patient was preparing dinner in the kitchen and the episode happened.  After she gained her consciousness she was immediately aware of situation she knew where she was,, she had slight problem calling her son with his name, however she had normal sensorium.  review of Systems:    12 point review of systems was reviewed and it was found negative unless otherwise mentioned in my note.    Past Medical and Surgical History:     Past Medical History:   Diagnosis Date    Seizure (HCC)        Past Surgical History:   Procedure Laterality Date     SECTION         Meds/Allergies:    Prior to Admission medications    Not on File     I have reviewed home medications with patient personally.    Allergies:   Allergies   Allergen Reactions    Latex Rash       Social History:     Marital Status: /Civil Union   Occupation:   Patient Pre-hospital Living Situation: home with her son  Patient Pre-hospital Level of Mobility: Independent  Patient Pre-hospital Diet Restrictions: None  Substance Use History:   Social History     Substance and Sexual Activity   Alcohol Use None     Social History     Tobacco Use   Smoking Status Not on file   Smokeless Tobacco Not on file     Social History     Substance and Sexual Activity   Drug Use Not on file       Family History:    No family history of seizure      Vitals:   Blood Pressure: 133/84 (24)  Pulse: (!) 118 (24)  Temperature: 98.5 °F (36.9 °C) (24)  Temp Source: Oral (24)  Respirations: 20 (24)  Weight - Scale: 103 kg  (226 lb 10.1 oz) (03/13/24 1907)  SpO2: 93 % (03/13/24 1907)    Physical Exam    General: No acute distress.  CVS: S1, S2, RRR, no murmur.  Respiratory: Clear to auscultation bilaterally.  GI: Abdomen obese, soft, nontender, bowel sound positive.  Neuro: Awake alert  oriented to person, place, time, cranial nerves grossly intact, no gross focal deficit      Additional Data:     Lab Results: I have personally reviewed pertinent reports.      Results from last 7 days   Lab Units 03/13/24 2004   WBC Thousand/uL 12.21*   HEMOGLOBIN g/dL 13.6   HEMATOCRIT % 40.5   PLATELETS Thousands/uL 304   NEUTROS PCT % 72   LYMPHS PCT % 17   MONOS PCT % 7   EOS PCT % 2     Results from last 7 days   Lab Units 03/13/24 2004   SODIUM mmol/L 138   POTASSIUM mmol/L 3.8   CHLORIDE mmol/L 105   CO2 mmol/L 24   BUN mg/dL 14   CREATININE mg/dL 0.89   ANION GAP mmol/L 9   CALCIUM mg/dL 9.0   ALBUMIN g/dL 4.1   TOTAL BILIRUBIN mg/dL 0.64   ALK PHOS U/L 67   ALT U/L 20   AST U/L 21   GLUCOSE RANDOM mg/dL 105                       Imaging: I have personally reviewed pertinent reports.      CT head without contrast   Final Result by Chao Street MD (03/13 2005)      No acute intracranial hemorrhage, mass effect or edema.                  Workstation performed: [id number]         XR chest 1 view portable   Final Result by Piedad Escamilla MD (03/13 2138)      No acute cardiopulmonary disease.            Workstation performed: [id number]             EKG, Pathology, and Other Studies Reviewed on Admission:   EKG: No evidence of tachyarrhythmia or bradycardia arrhythmia.    Allscripts / [facility] Records Reviewed: No     ** Please Note: This note has been constructed using a voice recognition system. **

## 2024-03-14 NOTE — ASSESSMENT & PLAN NOTE
likely orthostatic versus neurocardiogenic syncope.  Very less likely seizure disorder, despite witnessed body shakes.  No evidence of tongue bite, no evidence of bladder or bowel incontinence.  No history of seizure.  CT head with no abnormality.  EKG with no evidence of ST-T wave ischemic changes, no bradycardia or tachyarrhythmia.  Check orthostatic vitals.  If negative the patient can be discharged in the morning after reviewing her telemetry monitor to ensure no evidence of tacky or bradycardia arrhythmia that may be contributing to syncope.  No need for EEG at this time.

## 2024-03-14 NOTE — ASSESSMENT & PLAN NOTE
Patient has a hx of migraines   - usually left-sided  No active migraine at this time.     Plan:  Continue Sumatriptan as needed

## 2024-03-14 NOTE — DISCHARGE INSTR - AVS FIRST PAGE
Dear Nenita Lewis,     It was our pleasure to care for you here at [facility].  It is our hope that we were always able to exceed the expected standards for your care during your stay.  You were hospitalized due to loss of consciousness.  You were cared for on the 1st floor by Susana Moran DO under the service of Ruth Ayala MD with the [facility] who covers for your primary care physician (PCP) while you were hospitalized.  If you have any questions or concerns related to this hospitalization, you may contact us at .  For follow up as well as any medication refills, we recommend that you follow up with your primary care physician.  A registered nurse will reach out to you by phone within a few days after your discharge to answer any additional questions that you may have after going home.  However, at this time we provide for you here, the most important instructions / recommendations at discharge:     Notable Medication Adjustments -   None  Testing Required after Discharge -   None  Important follow up information -   Jennifer has received paperwork stating you have had a seizure and should not drive. You are being referred to see a neurologist, they will work with you and Jennifer to make sure it is safe for you to drive.  Please review this entire after visit summary as additional general instructions including medication list, appointments, activity, diet, any pertinent wound care, and other additional recommendations from your care team that may be provided for you.    Sincerely,     Susana Moran DO

## 2024-03-15 ENCOUNTER — APPOINTMENT (OUTPATIENT)
Dept: NEUROLOGY | Facility: HOSPITAL | Age: 39
End: 2024-03-15
Payer: COMMERCIAL

## 2024-03-15 ENCOUNTER — APPOINTMENT (OUTPATIENT)
Dept: MRI IMAGING | Facility: HOSPITAL | Age: 39
End: 2024-03-15
Payer: COMMERCIAL

## 2024-03-15 PROBLEM — D72.829 LEUKOCYTOSIS: Status: RESOLVED | Noted: 2024-03-13 | Resolved: 2024-03-15

## 2024-03-15 PROBLEM — E66.9 OBESITY: Chronic | Status: RESOLVED | Noted: 2024-03-13 | Resolved: 2024-03-15

## 2024-03-15 PROBLEM — R56.9 SEIZURE-LIKE ACTIVITY (HCC): Status: ACTIVE | Noted: 2024-03-15

## 2024-03-15 LAB
ANION GAP SERPL CALCULATED.3IONS-SCNC: 6 MMOL/L (ref 4–13)
BASOPHILS # BLD AUTO: 0.06 THOUSANDS/ÂΜL (ref 0–0.1)
BASOPHILS NFR BLD AUTO: 1 % (ref 0–1)
BUN SERPL-MCNC: 16 MG/DL (ref 5–25)
CALCIUM SERPL-MCNC: 8.6 MG/DL (ref 8.4–10.2)
CHLORIDE SERPL-SCNC: 107 MMOL/L (ref 96–108)
CO2 SERPL-SCNC: 26 MMOL/L (ref 21–32)
CREAT SERPL-MCNC: 0.82 MG/DL (ref 0.6–1.3)
EOSINOPHIL # BLD AUTO: 0.22 THOUSAND/ÂΜL (ref 0–0.61)
EOSINOPHIL NFR BLD AUTO: 3 % (ref 0–6)
ERYTHROCYTE [DISTWIDTH] IN BLOOD BY AUTOMATED COUNT: 13.3 % (ref 11.6–15.1)
GFR SERPL CREATININE-BSD FRML MDRD: 89 ML/MIN/1.73SQ M
GLUCOSE SERPL-MCNC: 93 MG/DL (ref 65–140)
HCT VFR BLD AUTO: 43.1 % (ref 34.8–46.1)
HGB BLD-MCNC: 14.2 G/DL (ref 11.5–15.4)
IMM GRANULOCYTES # BLD AUTO: 0.01 THOUSAND/UL (ref 0–0.2)
IMM GRANULOCYTES NFR BLD AUTO: 0 % (ref 0–2)
LYMPHOCYTES # BLD AUTO: 3.87 THOUSANDS/ÂΜL (ref 0.6–4.47)
LYMPHOCYTES NFR BLD AUTO: 49 % (ref 14–44)
MCH RBC QN AUTO: 31.3 PG (ref 26.8–34.3)
MCHC RBC AUTO-ENTMCNC: 32.9 G/DL (ref 31.4–37.4)
MCV RBC AUTO: 95 FL (ref 82–98)
MONOCYTES # BLD AUTO: 0.61 THOUSAND/ÂΜL (ref 0.17–1.22)
MONOCYTES NFR BLD AUTO: 8 % (ref 4–12)
NEUTROPHILS # BLD AUTO: 3.01 THOUSANDS/ÂΜL (ref 1.85–7.62)
NEUTS SEG NFR BLD AUTO: 39 % (ref 43–75)
NRBC BLD AUTO-RTO: 0 /100 WBCS
PLATELET # BLD AUTO: 280 THOUSANDS/UL (ref 149–390)
PMV BLD AUTO: 9.6 FL (ref 8.9–12.7)
POTASSIUM SERPL-SCNC: 3.9 MMOL/L (ref 3.5–5.3)
RBC # BLD AUTO: 4.54 MILLION/UL (ref 3.81–5.12)
SODIUM SERPL-SCNC: 139 MMOL/L (ref 135–147)
WBC # BLD AUTO: 7.78 THOUSAND/UL (ref 4.31–10.16)

## 2024-03-15 PROCEDURE — 85025 COMPLETE CBC W/AUTO DIFF WBC: CPT

## 2024-03-15 PROCEDURE — 95816 EEG AWAKE AND DROWSY: CPT

## 2024-03-15 PROCEDURE — A9585 GADOBUTROL INJECTION: HCPCS | Performed by: INTERNAL MEDICINE

## 2024-03-15 PROCEDURE — 99232 SBSQ HOSP IP/OBS MODERATE 35: CPT | Performed by: INTERNAL MEDICINE

## 2024-03-15 PROCEDURE — 99244 OFF/OP CNSLTJ NEW/EST MOD 40: CPT | Performed by: INTERNAL MEDICINE

## 2024-03-15 PROCEDURE — 95816 EEG AWAKE AND DROWSY: CPT | Performed by: PSYCHIATRY & NEUROLOGY

## 2024-03-15 PROCEDURE — 80048 BASIC METABOLIC PNL TOTAL CA: CPT

## 2024-03-15 PROCEDURE — 70553 MRI BRAIN STEM W/O & W/DYE: CPT

## 2024-03-15 RX ORDER — GADOBUTROL 604.72 MG/ML
10 INJECTION INTRAVENOUS
Status: COMPLETED | OUTPATIENT
Start: 2024-03-15 | End: 2024-03-15

## 2024-03-15 RX ADMIN — POLYETHYLENE GLYCOL 3350 17 G: 17 POWDER, FOR SOLUTION ORAL at 12:51

## 2024-03-15 RX ADMIN — SERTRALINE HYDROCHLORIDE 50 MG: 50 TABLET ORAL at 21:10

## 2024-03-15 RX ADMIN — PANTOPRAZOLE SODIUM 40 MG: 40 TABLET, DELAYED RELEASE ORAL at 21:10

## 2024-03-15 RX ADMIN — ALPRAZOLAM 0.5 MG: 0.5 TABLET ORAL at 21:10

## 2024-03-15 RX ADMIN — LORAZEPAM 1 MG: 2 INJECTION INTRAMUSCULAR; INTRAVENOUS at 16:34

## 2024-03-15 RX ADMIN — GADOBUTROL 10 ML: 604.72 INJECTION INTRAVENOUS at 18:07

## 2024-03-15 RX ADMIN — LORAZEPAM 1 MG: 2 INJECTION INTRAMUSCULAR; INTRAVENOUS at 16:59

## 2024-03-15 NOTE — DISCHARGE SUMMARY
[facility]  Discharge- Nenita Lewis 1985, 39 y.o. female MRN: [id number]  Unit/Bed#: S -01 Encounter: [id number]  Primary Care Provider: No primary care provider on file.   Date and time admitted to hospital: 3/13/2024  7:01 PM    * Syncope  Assessment & Plan  Likely orthostatic versus neurocardiogenic syncope, but cannot rule out seizure activity at this time.   No evidence of tongue bite, no evidence of bladder or bowel incontinence.  No history of seizure disorder  No prodromal symptoms.   CT head with no abnormality.  EKG with no evidence of ST-T wave ischemic changes, no bradycardia or tachyarrhythmia.  Orthostatic vitals negative  Patient currently at baseline on exam  ECHO with EF of 55%, notable for PFO  EEG normal  MRI without acute findings    Plan:  Consult cardiology - not felt to have a cardiac etiology  Consult neuro - plan for outpatient follow up, seizure suspected  - [employer] form completed and submitted online       Seizure-like activity (HCC)  Assessment & Plan  See plan for syncope    Hx of migraines  Assessment & Plan  Patient has a hx of migraines   - usually left-sided  No active migraine at this time.     Plan:  Continue Sumatriptan as needed    Leukocytosis  Assessment & Plan  Resolved, unknown etiology.  Recent Labs     03/14/24  0642 03/15/24  0615 03/16/24  0450   WBC 9.41 7.78 12.31*       Patient remains afebrile.   No signs of active infections noted on physical exam.   Will monitor.    Depression  Assessment & Plan  Stable.   Continue with Zoloft.    Obesity-resolved as of 3/15/2024  Assessment & Plan  Outpatient management and follow-up.        Medical Problems       Resolved Problems  Date Reviewed: 3/13/2024            Resolved    Obesity 3/15/2024     Resolved by  Susana Moran DO        Discharging Resident: Jamar Bright MD  Discharging Attending: Ruth Ayala MD  PCP: No primary care provider on file.  Admission Date:    Admission Orders (From admission, onward)       Ordered        03/13/24 2109  Place in Observation  Once                          Discharge Date: 03/16/24    Consultations During Hospital Stay:  Neurology, cardiology    Procedures Performed:   EEG    Significant Findings / Test Results:   none    Incidental Findings:   none   I reviewed the above mentioned incidental findings with the patient and/or family and they expressed understanding.    Test Results Pending at Discharge (will require follow up):  None     Outpatient Tests Requested:  None    Complications:  None    Reason for Admission: syncope    Hospital Course:     Nenita Lewis is a 39 y.o. female  who originally presented to the hospital on 3/13/2024 with PMH of depression, presented to the hospital by EMS after her son saw her unconscious on the kitchen floor with abnormal movements and of frothy secretions from her mouth.   CT showed no acute abnormalities, ECG sinus tachy, troponin wnl, no events on tele overnight, orthostatic BP negative. An echo was preformed and showed EF 55%, normal systolic and diastolic function; patent foramen ovale was not thought to be etiology of syncope per cardiology. Neurology was consulted and recommendations appreciated. MRI and EEG were normal. [employer] paperwork was sent in reporting pt had a possible seizure. Pt was planned for outpatient neurology follow up.    Please see above list of diagnoses and related plan for additional information.     Condition at Discharge: good    Discharge Day Visit / Exam:   Subjective:      Vitals: Blood Pressure: 128/81 (03/16/24 0814)  Pulse: 102 (03/16/24 0814)  Temperature: 98.5 °F (36.9 °C) (03/16/24 0814)  Temp Source: Oral (03/15/24 2053)  Respirations: 18 (03/15/24 2053)  Weight - Scale: 103 kg (226 lb) (03/14/24 1300)  SpO2: 95 % (03/16/24 0814)  Exam:   Physical Exam  Vitals and nursing note reviewed.   Constitutional:       General: She is not in acute distress.      Appearance: She is well-developed.   HENT:      Head: Normocephalic and atraumatic.   Eyes:      Conjunctiva/sclera: Conjunctivae normal.   Cardiovascular:      Rate and Rhythm: Normal rate and regular rhythm.      Heart sounds: No murmur heard.  Pulmonary:      Effort: Pulmonary effort is normal. No respiratory distress.      Breath sounds: Normal breath sounds.   Abdominal:      Palpations: Abdomen is soft.      Tenderness: There is no abdominal tenderness.   Musculoskeletal:         General: No swelling.      Cervical back: Neck supple.   Skin:     General: Skin is warm and dry.      Capillary Refill: Capillary refill takes less than 2 seconds.   Neurological:      Mental Status: She is alert.   Psychiatric:         Mood and Affect: Mood normal.          Discussion with Family: Patient declined call to .     Discharge instructions/Information to patient and family:   See after visit summary for information provided to patient and family.      Provisions for Follow-Up Care:  See after visit summary for information related to follow-up care and any pertinent home health orders.      Mobility at time of Discharge:   Basic Mobility Inpatient Raw Score: 24  JH-HLM Goal: 8: Walk 250 feet or more  JH-HLM Achieved: 7: Walk 25 feet or more  HLM Goal NOT achieved. Continue to encourage mobility in post discharge setting.     Disposition:   Home    Planned Readmission: no    Discharge Medications:  See after visit summary for reconciled discharge medications provided to patient and/or family.      **Please Note: This note may have been constructed using a voice recognition system**

## 2024-03-15 NOTE — PLAN OF CARE
Problem: PAIN - ADULT  Goal: Verbalizes/displays adequate comfort level or baseline comfort level  Description: Interventions:  - Encourage patient to monitor pain and request assistance  - Assess pain using appropriate pain scale  - Administer analgesics based on type and severity of pain and evaluate response  - Implement non-pharmacological measures as appropriate and evaluate response  - Consider cultural and social influences on pain and pain management  - Notify physician/advanced practitioner if interventions unsuccessful or patient reports new pain  Outcome: Progressing     Problem: INFECTION - ADULT  Goal: Absence or prevention of progression during hospitalization  Description: INTERVENTIONS:  - Assess and monitor for signs and symptoms of infection  - Monitor lab/diagnostic results  - Monitor all insertion sites, i.e. indwelling lines, tubes, and drains  - Monitor endotracheal if appropriate and nasal secretions for changes in amount and color  - [place] appropriate cooling/warming therapies per order  - Administer medications as ordered  - Instruct and encourage patient and family to use good hand hygiene technique  - Identify and instruct in appropriate isolation precautions for identified infection/condition  Outcome: Progressing  Goal: Absence of fever/infection during neutropenic period  Description: INTERVENTIONS:  - Monitor WBC    Outcome: Progressing     Problem: SAFETY ADULT  Goal: Patient will remain free of falls  Description: INTERVENTIONS:  - Educate patient/family on patient safety including physical limitations  - Instruct patient to call for assistance with activity   - Consult OT/PT to assist with strengthening/mobility   - Keep Call bell within reach  - Keep bed low and locked with side rails adjusted as appropriate  - Keep care items and personal belongings within reach  - Initiate and maintain comfort rounds  - Make Fall Risk Sign visible to staff  - Offer Toileting every 2 Hours,  in advance of need  - Initiate/Maintain bed and cahir alarm  - Obtain necessary fall risk management equipment:   - Apply yellow socks and bracelet for high fall risk patients  - Consider moving patient to room near nurses station  Outcome: Progressing  Goal: Maintain or return to baseline ADL function  Description: INTERVENTIONS:  -  Assess patient's ability to carry out ADLs; assess patient's baseline for ADL function and identify physical deficits which impact ability to perform ADLs (bathing, care of mouth/teeth, toileting, grooming, dressing, etc.)  - Assess/evaluate cause of self-care deficits   - Assess range of motion  - Assess patient's mobility; develop plan if impaired  - Assess patient's need for assistive devices and provide as appropriate  - Encourage maximum independence but intervene and supervise when necessary  - Involve family in performance of ADLs  - Assess for home care needs following discharge   - Consider OT consult to assist with ADL evaluation and planning for discharge  - Provide patient education as appropriate  Outcome: Progressing  Goal: Maintains/Returns to pre admission functional level  Description: INTERVENTIONS:  - Perform AM-PAC 6 Click Basic Mobility/ Daily Activity assessment daily.  - Set and communicate daily mobility goal to care team and patient/family/caregiver.   - Collaborate with rehabilitation services on mobility goals if consulted  - Perform Range of Motion 3 times a day.  - Reposition patient every 2 hours.  - Dangle patient 3 times a day  - Stand patient 3 times a day  - Ambulate patient 3 times a day  - Out of bed to chair 3 times a day   - Out of bed for meals 3 times a day  - Out of bed for toileting  - Record patient progress and toleration of activity level   Outcome: Progressing     Problem: DISCHARGE PLANNING  Goal: Discharge to home or other facility with appropriate resources  Description: INTERVENTIONS:  - Identify barriers to discharge w/patient and  caregiver  - Arrange for needed discharge resources and transportation as appropriate  - Identify discharge learning needs (meds, wound care, etc.)  - Arrange for interpretive services to assist at discharge as needed  - Refer to Case Management Department for coordinating discharge planning if the patient needs post-hospital services based on physician/advanced practitioner order or complex needs related to functional status, cognitive ability, or social support system  Outcome: Progressing     Problem: Knowledge Deficit  Goal: Patient/family/caregiver demonstrates understanding of disease process, treatment plan, medications, and discharge instructions  Description: Complete learning assessment and assess knowledge base.  Interventions:  - Provide teaching at level of understanding  - Provide teaching via preferred learning methods  Outcome: Progressing     Problem: NEUROSENSORY - ADULT  Goal: Achieves stable or improved neurological status  Description: INTERVENTIONS  - Monitor and report changes in neurological status  - Monitor vital signs such as temperature, blood pressure, glucose, and any other labs ordered   - Initiate measures to prevent increased intracranial pressure  - Monitor for seizure activity and implement precautions if appropriate      Outcome: Progressing  Goal: Remains free of injury related to seizures activity  Description: INTERVENTIONS  - Maintain airway, patient safety  and administer oxygen as ordered  - Monitor patient for seizure activity, document and report duration and description of seizure to physician/advanced practitioner  - If seizure occurs,  ensure patient safety during seizure  - Reorient patient post seizure  - Seizure pads on all 4 side rails  - Instruct patient/family to notify RN of any seizure activity including if an aura is experienced  - Instruct patient/family to call for assistance with activity based on nursing assessment  - Administer anti-seizure medications if  ordered    Outcome: Progressing  Goal: Achieves maximal functionality and self care  Description: INTERVENTIONS  - Monitor swallowing and airway patency with patient fatigue and changes in neurological status  - Encourage and assist patient to increase activity and self care.   - Encourage visually impaired, hearing impaired and aphasic patients to use assistive/communication devices  Outcome: Progressing

## 2024-03-15 NOTE — ASSESSMENT & PLAN NOTE
Likely orthostatic versus neurocardiogenic syncope, but cannot rule out seizure activity at this time.   No evidence of tongue bite, no evidence of bladder or bowel incontinence.  No history of seizure disorder  No prodromal symptoms.   CT head with no abnormality.  EKG with no evidence of ST-T wave ischemic changes, no bradycardia or tachyarrhythmia.  Orthostatic vitals negative  Patient currently at baseline on exam  ECHO with EF of 55%, notable for PFO  EEG normal    Plan:  Consult cardiology, appreciate recommendations  Consult neuro, appreciate recommendation  - Follow-up MRI  - [employer] form completed and submitted online

## 2024-03-15 NOTE — QUICK NOTE
Patient seen at bedside, neurology exam continues to remain stable and non focal.   Reeg, within normal limits.   Pending MRI brain w/wo contrast, if this is with in normal limits, then no further inpatient neuro recommendations will follow outpatient.   [employer] form was filled and faxed.

## 2024-03-15 NOTE — PLAN OF CARE
Problem: PAIN - ADULT  Goal: Verbalizes/displays adequate comfort level or baseline comfort level  Description: Interventions:  - Encourage patient to monitor pain and request assistance  - Assess pain using appropriate pain scale  - Administer analgesics based on type and severity of pain and evaluate response  - Implement non-pharmacological measures as appropriate and evaluate response  - Consider cultural and social influences on pain and pain management  - Notify physician/advanced practitioner if interventions unsuccessful or patient reports new pain  Outcome: Progressing     Problem: INFECTION - ADULT  Goal: Absence or prevention of progression during hospitalization  Description: INTERVENTIONS:  - Assess and monitor for signs and symptoms of infection  - Monitor lab/diagnostic results  - Monitor all insertion sites, i.e. indwelling lines, tubes, and drains  - Monitor endotracheal if appropriate and nasal secretions for changes in amount and color  - [place] appropriate cooling/warming therapies per order  - Administer medications as ordered  - Instruct and encourage patient and family to use good hand hygiene technique  - Identify and instruct in appropriate isolation precautions for identified infection/condition  Outcome: Progressing  Goal: Absence of fever/infection during neutropenic period  Description: INTERVENTIONS:  - Monitor WBC    Outcome: Progressing     Problem: SAFETY ADULT  Goal: Patient will remain free of falls  Description: INTERVENTIONS:  - Educate patient/family on patient safety including physical limitations  - Instruct patient to call for assistance with activity   - Consult OT/PT to assist with strengthening/mobility   - Keep Call bell within reach  - Keep bed low and locked with side rails adjusted as appropriate  - Keep care items and personal belongings within reach  - Initiate and maintain comfort rounds  - Make Fall Risk Sign visible to staff  - Offer Toileting every 2 Hours,  in advance of need  - Initiate/Maintain bed/chair alarm  - Obtain necessary fall risk management equipment  - Apply yellow socks and bracelet for high fall risk patients  - Consider moving patient to room near nurses station  Outcome: Progressing  Goal: Maintain or return to baseline ADL function  Description: INTERVENTIONS:  -  Assess patient's ability to carry out ADLs; assess patient's baseline for ADL function and identify physical deficits which impact ability to perform ADLs (bathing, care of mouth/teeth, toileting, grooming, dressing, etc.)  - Assess/evaluate cause of self-care deficits   - Assess range of motion  - Assess patient's mobility; develop plan if impaired  - Assess patient's need for assistive devices and provide as appropriate  - Encourage maximum independence but intervene and supervise when necessary  - Involve family in performance of ADLs  - Assess for home care needs following discharge   - Consider OT consult to assist with ADL evaluation and planning for discharge  - Provide patient education as appropriate  Outcome: Progressing  Goal: Maintains/Returns to pre admission functional level  Description: INTERVENTIONS:  - Perform AM-PAC 6 Click Basic Mobility/ Daily Activity assessment daily.  - Set and communicate daily mobility goal to care team and patient/family/caregiver.   - Collaborate with rehabilitation services on mobility goals if consulted  - Perform Range of Motion   - Reposition patient   - Dangle patient  - Stand patient   - Ambulate patient   - Out of bed to chair    - Out of bed for meals   - Out of bed for toileting  - Record patient progress and toleration of activity level   Outcome: Progressing     Problem: DISCHARGE PLANNING  Goal: Discharge to home or other facility with appropriate resources  Description: INTERVENTIONS:  - Identify barriers to discharge w/patient and caregiver  - Arrange for needed discharge resources and transportation as appropriate  - Identify  discharge learning needs (meds, wound care, etc.)  - Arrange for interpretive services to assist at discharge as needed  - Refer to Case Management Department for coordinating discharge planning if the patient needs post-hospital services based on physician/advanced practitioner order or complex needs related to functional status, cognitive ability, or social support system  Outcome: Progressing     Problem: Knowledge Deficit  Goal: Patient/family/caregiver demonstrates understanding of disease process, treatment plan, medications, and discharge instructions  Description: Complete learning assessment and assess knowledge base.  Interventions:  - Provide teaching at level of understanding  - Provide teaching via preferred learning methods  Outcome: Progressing     Problem: NEUROSENSORY - ADULT  Goal: Achieves stable or improved neurological status  Description: INTERVENTIONS  - Monitor and report changes in neurological status  - Monitor vital signs such as temperature, blood pressure, glucose, and any other labs ordered   - Initiate measures to prevent increased intracranial pressure  - Monitor for seizure activity and implement precautions if appropriate      Outcome: Progressing  Goal: Remains free of injury related to seizures activity  Description: INTERVENTIONS  - Maintain airway, patient safety  and administer oxygen as ordered  - Monitor patient for seizure activity, document and report duration and description of seizure to physician/advanced practitioner  - If seizure occurs,  ensure patient safety during seizure  - Reorient patient post seizure  - Seizure pads on all 4 side rails  - Instruct patient/family to notify RN of any seizure activity including if an aura is experienced  - Instruct patient/family to call for assistance with activity based on nursing assessment  - Administer anti-seizure medications if ordered    Outcome: Progressing  Goal: Achieves maximal functionality and self care  Description:  INTERVENTIONS  - Monitor swallowing and airway patency with patient fatigue and changes in neurological status  - Encourage and assist patient to increase activity and self care.   - Encourage visually impaired, hearing impaired and aphasic patients to use assistive/communication devices  Outcome: Progressing

## 2024-03-15 NOTE — PROGRESS NOTES
[facility]  Progress Note  Name: Nenita Lewis I  MRN: [id number]  Unit/Bed#: S -01 I Date of Admission: 3/13/2024   Date of Service: 3/15/2024 I Hospital Day: 0    Assessment/Plan   * Syncope  Assessment & Plan  Likely orthostatic versus neurocardiogenic syncope, but cannot rule out seizure activity at this time.   No evidence of tongue bite, no evidence of bladder or bowel incontinence.  No history of seizure disorder  No prodromal symptoms.   CT head with no abnormality.  EKG with no evidence of ST-T wave ischemic changes, no bradycardia or tachyarrhythmia.  Orthostatic vitals negative  Patient currently at baseline on exam  ECHO with EF of 55%, notable for PFO  EEG normal    Plan:  Consult cardiology, appreciate recommendations  Consult neuro, appreciate recommendation  - Follow-up MRI  - [employer] form completed and submitted online       Hx of migraines  Assessment & Plan  Patient has a hx of migraines   - usually left-sided  No active migraine at this time.     Plan:  Continue Sumatriptan as needed    Depression  Assessment & Plan  Stable.   Continue with Zoloft.    Seizure-like activity (HCC)  Assessment & Plan  See plan for syncope        VTE Pharmacologic Prophylaxis:   Low Risk (Score 0-2) - Encourage Ambulation.    Mobility:   Basic Mobility Inpatient Raw Score: 24  JH-HLM Goal: 8: Walk 250 feet or more  JH-HLM Achieved: 7: Walk 25 feet or more  JH-HLM Goal NOT achieved. Continue with multidisciplinary rounding and encourage appropriate mobility to improve upon JH-HLM goals.    Patient Centered Rounds: I performed bedside rounds with nursing staff today.  Discussions with Specialists or Other Care Team Provider: Attending physician    Education and Discussions with Family / Patient: Updated  (mother) via phone.    Current Length of Stay: 0 day(s)  Current Patient Status: Observation   Discharge Plan: Anticipate discharge tomorrow to home.    Code Status: Level  1 - Full Code    Subjective:   40 yo female examined at bedside. No acute distress. No overnight events reported. Patient reports feeling overall well. She denies headaches, nausea, chest pain, palpitations, and shortness of breath. No concerns at this time.    Objective:     Vitals:   Temp (24hrs), Av.2 °F (36.8 °C), Min:97.9 °F (36.6 °C), Max:98.5 °F (36.9 °C)    Temp:  [97.9 °F (36.6 °C)-98.5 °F (36.9 °C)] 98.5 °F (36.9 °C)  HR:  [66-82] 82  Resp:  [18-20] 18  BP: (111-120)/(70-92) 120/70  SpO2:  [94 %-96 %] 94 %  There is no height or weight on file to calculate BMI.     Input and Output Summary (last 24 hours):     Intake/Output Summary (Last 24 hours) at 3/15/2024 1643  Last data filed at 3/15/2024 0000  Gross per 24 hour   Intake 0 ml   Output --   Net 0 ml       Physical Exam:   Physical Exam  Constitutional:       General: She is not in acute distress.     Appearance: Normal appearance. She is not ill-appearing.   HENT:      Head: Normocephalic and atraumatic.      Right Ear: External ear normal.      Left Ear: External ear normal.      Nose: Nose normal.      Mouth/Throat:      Pharynx: Oropharynx is clear.   Eyes:      General:         Right eye: No discharge.         Left eye: No discharge.      Conjunctiva/sclera: Conjunctivae normal.   Cardiovascular:      Rate and Rhythm: Normal rate and regular rhythm.      Pulses: Normal pulses.      Heart sounds: Normal heart sounds. No murmur heard.  Pulmonary:      Effort: Pulmonary effort is normal. No respiratory distress.      Breath sounds: Normal breath sounds. No stridor. No wheezing, rhonchi or rales.   Abdominal:      General: Bowel sounds are normal. There is no distension.      Palpations: Abdomen is soft.   Musculoskeletal:      Cervical back: Neck supple. No tenderness.      Right lower leg: No edema.      Left lower leg: No edema.   Skin:     General: Skin is warm and dry.   Neurological:      General: No focal deficit present.      Mental  Status: She is oriented to person, place, and time. Mental status is at baseline.   Psychiatric:         Mood and Affect: Mood normal.         Behavior: Behavior normal.          Additional Data:     Labs:  Results from last 7 days   Lab Units 03/15/24  0615   WBC Thousand/uL 7.78   HEMOGLOBIN g/dL 14.2   HEMATOCRIT % 43.1   PLATELETS Thousands/uL 280   NEUTROS PCT % 39*   LYMPHS PCT % 49*   MONOS PCT % 8   EOS PCT % 3     Results from last 7 days   Lab Units 03/15/24  0615 03/13/24 2004   SODIUM mmol/L 139 138   POTASSIUM mmol/L 3.9 3.8   CHLORIDE mmol/L 107 105   CO2 mmol/L 26 24   BUN mg/dL 16 14   CREATININE mg/dL 0.82 0.89   ANION GAP mmol/L 6 9   CALCIUM mg/dL 8.6 9.0   ALBUMIN g/dL  --  4.1   TOTAL BILIRUBIN mg/dL  --  0.64   ALK PHOS U/L  --  67   ALT U/L  --  20   AST U/L  --  21   GLUCOSE RANDOM mg/dL 93 105                     Lines/Drains:  Invasive Devices       Peripheral Intravenous Line  Duration             Peripheral IV 03/15/24 Left;Ventral (anterior) Forearm <1 day                    Telemetry:  Telemetry Orders (From admission, onward)               24 Hour Telemetry Monitoring  Continuous x 24 Hours (Telem)        Expiring   Question:  Reason for 24 Hour Telemetry  Answer:  Syncope suspected to be cardiac in origin                     Telemetry Reviewed: Normal Sinus Rhythm  Indication for Continued Telemetry Use: No indication for continued use. Will discontinue.              Imaging: Reviewed radiology reports from this admission including: CT head and ECHO    Recent Cultures (last 7 days):         Last 24 Hours Medication List:   Current Facility-Administered Medications   Medication Dose Route Frequency Provider Last Rate    acetaminophen  650 mg Oral Q6H PRN Lyndon Doan MD      ALPRAZolam  0.5 mg Oral HS Lyndon Doan MD      ibuprofen  400 mg Oral Q6H PRN Ruth Ayala MD      LORazepam  1 mg Intravenous Once in imaging An HANH Valdez      LORazepam  2 mg Intravenous Q6H PRN  Lyndon Doan MD      ondansetron  4 mg Intravenous Q6H PRN Lyndon Doan MD      pantoprazole  40 mg Oral HS Lyndon Doan MD      polyethylene glycol  17 g Oral Daily Lyndon Doan MD      sertraline  50 mg Oral HS Lyndon Doan MD      SUMAtriptan  50 mg Oral Once PRN Lyndon Doan MD          Today, Patient Was Seen By: Susana Moran, DO    **Please Note: This note may have been constructed using a voice recognition system.**

## 2024-03-15 NOTE — CONSULTS
Consultation - Cardiology Team One  Nenita Lewis 39 y.o. female MRN: [id number]  Unit/Bed#: S -01 Encounter: [id number]    Inpatient consult to Cardiology  Consult performed by: Kymberly Chen PA-C  Consult ordered by: Ruth Ayala MD          Physician Requesting Consult: Ruth Ayala MD  Reason for Consult / Principal Problem: Syncope, PFO    Assessment:    1.  Syncope versus seizure: Patient was making dinner and then woke up on the floor.  Son found her unconscious on the floor shaking and drooling.  CTH with no acute intracranial abnormality.  Orthostatics were negative.  Telemetry with no arrhythmias noted.  Neurology consulted with MRI and EEG pending.    2.  PFO: With right to left shunting noted on echocardiogram yesterday.    3.  Preserved biventricular systolic function: EF 55% with no WMA, diastolic function, normal RV function, no significant valvular abnormality and PFO as in #2.    4.  Depression: Maintained on Zoloft    5.  History of migraines: On Imitrex as needed      Plan/Recommendations:  Symptoms sound to be seizure-like activity with workup per neurology  No further workup from a cardiac standpoint  _____________________________________________________________________    CC: Syncope      History of Present Illness   HPI: Nenita Lewis is a 39 y.o. year old female who has depression, history of migraines, history of seizures as a child who presented to the emergency room at [facility] on 3/13/2024 via EMS with a syncope versus seizure.  Patient was making dinner and the next thing she remembers she was on the floor with her son over her.  Patient's son states that he was in the next room and heard his mom fall and found her on the kitchen floor unconscious, shaking and drooling.  Episode lasted 2-3 minutes with confusion afterwards.  On arrival to the ED patient was tachycardic with stable BP and oxygen saturation 93% on room air.  EKG revealed sinus  tachycardia with inferior T wave abnormality.  CXR revealed no acute cardiopulmonary disease.  CTH revealed no acute intracranial abnormality.  Labs revealed stable CMP, negative troponin x 3, WBC 12 otherwise stable CBC, negative D-dimer.  Respiratory panel was negative for influenza/RSV/COVID.  Orthostatic vital signs were negative.  An echocardiogram was done that revealed preserved BiV function with an EF of 55% and a PFO with right to left shunting.  Due to concerns of seizure-like episode neurology was consulted and an MRI of the brain and EEG was ordered.  Cardiology has been consulted for further evaluation and management of syncope and PFO.    Patient resting in bed during consultation and denies any chest pain, shortness of breath, palpitations, lightheadedness or dizziness.  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    Social history: Denies any tobacco, alcohol or illicit drug use.    Family history: Denies any family history of CAD or sudden cardiac death.    Echocardiogram 3/14/2024: EF 55%, no WMA, normal diastolic function, normal RV function, PFO with predominant right to left shunting, no significant valvular abnormality.    EKG reviewed personally: 3/13/2024-sinus tachycardia at a rate of 113 bpm with inferior T wave abnormality.  No prior EKG available for comparison.    Telemetry reviewed personally: Normal sinus rhythm with heart rates in the 50-70s.        Review of Systems   Constitutional: Negative. Negative for chills.   Cardiovascular:  Negative for chest pain, dyspnea on exertion, leg swelling, near-syncope, orthopnea, palpitations, paroxysmal nocturnal dyspnea and syncope.   Respiratory: Negative.  Negative for cough, shortness of breath and wheezing.    Endocrine: Negative.    Hematologic/Lymphatic: Negative.    Skin: Negative.    Musculoskeletal:  Negative.    Gastrointestinal: Negative.  Negative for diarrhea, nausea and vomiting.   Neurological:  Negative for dizziness, light-headedness and weakness.   Psychiatric/Behavioral: Negative.  Negative for altered mental status.    All other systems reviewed and are negative.    Historical Information   Past Medical History:   Diagnosis Date    Seizure (HCC)      Past Surgical History:   Procedure Laterality Date     SECTION       Social History     Substance and Sexual Activity   Alcohol Use None     Social History     Substance and Sexual Activity   Drug Use Not on file     Social History     Tobacco Use   Smoking Status Not on file   Smokeless Tobacco Not on file     Family History: History reviewed. No pertinent family history.    Meds/Allergies   all current active meds have been reviewed, current meds:   Current Facility-Administered Medications   Medication Dose Route Frequency    acetaminophen (TYLENOL) tablet 650 mg  650 mg Oral Q6H PRN    ALPRAZolam (XANAX) tablet 0.5 mg  0.5 mg Oral HS    ibuprofen (MOTRIN) oral suspension 400 mg  400 mg Oral Q6H PRN    LORazepam (ATIVAN) injection 1 mg  1 mg Intravenous Once in imaging    LORazepam (ATIVAN) injection 1 mg  1 mg Intravenous Once in imaging    LORazepam (ATIVAN) injection 2 mg  2 mg Intravenous Q6H PRN    ondansetron (ZOFRAN) injection 4 mg  4 mg Intravenous Q6H PRN    pantoprazole (PROTONIX) EC tablet 40 mg  40 mg Oral HS    polyethylene glycol (MIRALAX) packet 17 g  17 g Oral Daily    sertraline (ZOLOFT) tablet 50 mg  50 mg Oral HS    SUMAtriptan (IMITREX) tablet 50 mg  50 mg Oral Once PRN   , and PTA meds:   Prior to Admission Medications   Prescriptions Last Dose Informant Patient Reported? Taking?   ALPRAZolam (Xanax) 0.5 mg tablet 3/12/2024  Yes Yes   Sig: Take 0.5 mg by mouth daily at bedtime   SUMAtriptan (IMITREX) 50 mg tablet Past Month  Yes Yes   Sig: Take 50 mg by mouth once as needed for migraine   mometasone (ELOCON) 0.1 % cream  Past Month  Yes Yes   Sig: Apply 0.1 Applications topically daily as needed (for rash flare up)   omeprazole (PriLOSEC) 20 mg delayed release capsule 3/12/2024  Yes Yes   Sig: Take 20 mg by mouth daily at bedtime   sertraline (Zoloft) 50 mg tablet 3/12/2024  Yes Yes   Sig: Take 50 mg by mouth daily at bedtime      Facility-Administered Medications: None          Allergies   Allergen Reactions    Latex Rash       Objective   Vitals: Blood pressure 114/70, pulse 66, temperature 97.9 °F (36.6 °C), temperature source Oral, resp. rate 20, weight 103 kg (226 lb), last menstrual period 2024, SpO2 96%.,     There is no height or weight on file to calculate BMI.,     Systolic (24hrs), Av , Min:111 , Max:127     Diastolic (24hrs), Av, Min:56, Max:92    Wt Readings from Last 3 Encounters:   24 103 kg (226 lb)      Lab Results   Component Value Date    CREATININE 0.82 03/15/2024    CREATININE 0.89 2024    CREATININE 0.82 2023             Intake/Output Summary (Last 24 hours) at 3/15/2024 1023  Last data filed at 3/15/2024 0000  Gross per 24 hour   Intake 0 ml   Output --   Net 0 ml     Weight (last 2 days)       Date/Time Weight    24 1300 103 (226)    24 1907 103 (226.63)          Invasive Devices       Peripheral Intravenous Line  Duration             Peripheral IV 03/15/24 Left;Ventral (anterior) Forearm <1 day                      Physical Exam  Vitals and nursing note reviewed.   Constitutional:       General: She is not in acute distress.     Appearance: She is well-developed.      Comments: On RA in NAD   HENT:      Head: Normocephalic and atraumatic.   Neck:      Vascular: No JVD.   Cardiovascular:      Rate and Rhythm: Normal rate and regular rhythm.      Heart sounds: Normal heart sounds. No murmur heard.     No friction rub.   Pulmonary:      Effort: Pulmonary effort is normal. No respiratory distress.      Breath sounds: Normal breath sounds. No wheezing or rales.  "  Abdominal:      General: Bowel sounds are normal. There is no distension.      Palpations: Abdomen is soft.      Tenderness: There is no abdominal tenderness.   Musculoskeletal:         General: No tenderness. Normal range of motion.      Cervical back: Normal range of motion and neck supple.      Right lower leg: No edema.      Left lower leg: No edema.   Skin:     General: Skin is warm and dry.      Findings: No erythema.   Neurological:      Mental Status: She is alert and oriented to person, place, and time.   Psychiatric:         Mood and Affect: Mood normal.         Behavior: Behavior normal.         Thought Content: Thought content normal.         Judgment: Judgment normal.           LABORATORY RESULTS:      CBC with diff:   Results from last 7 days   Lab Units 03/15/24  0615 03/14/24  0642 03/13/24 2004   WBC Thousand/uL 7.78 9.41 12.21*   HEMOGLOBIN g/dL 14.2 12.6 13.6   HEMATOCRIT % 43.1 37.6 40.5   MCV fL 95 93 91   PLATELETS Thousands/uL 280 261 304   RBC Million/uL 4.54 4.06 4.47   MCH pg 31.3 31.0 30.4   MCHC g/dL 32.9 33.5 33.6   RDW % 13.3 13.5 13.2   MPV fL 9.6 9.1 9.5   NRBC AUTO /100 WBCs 0 0 0       CMP:  Results from last 7 days   Lab Units 03/15/24  0615 03/13/24 2004   POTASSIUM mmol/L 3.9 3.8   CHLORIDE mmol/L 107 105   CO2 mmol/L 26 24   BUN mg/dL 16 14   CREATININE mg/dL 0.82 0.89   CALCIUM mg/dL 8.6 9.0   AST U/L  --  21   ALT U/L  --  20   ALK PHOS U/L  --  67   EGFR ml/min/1.73sq m 89 80       BMP:  Results from last 7 days   Lab Units 03/15/24  0615 03/13/24 2004   POTASSIUM mmol/L 3.9 3.8   CHLORIDE mmol/L 107 105   CO2 mmol/L 26 24   BUN mg/dL 16 14   CREATININE mg/dL 0.82 0.89   CALCIUM mg/dL 8.6 9.0          No results found for: \"NTBNP\"      Lipid Profile:   No results found for: \"CHOL\"  No results found for: \"HDL\"  No results found for: \"LDLCALC\"  No results found for: \"TRIG\"      Cardiac testing:   No results found for this or any previous visit.    No results found for this " or any previous visit.    No valid procedures specified.  No results found for this or any previous visit.        Imaging: I have personally reviewed pertinent reports.    Echo complete w/ contrast if indicated    Result Date: 3/14/2024  Narrative:   Left Ventricle: Left ventricular cavity size is normal. Wall thickness is normal. The left ventricular ejection fraction is 55%. Systolic function is normal. Wall motion is normal. Diastolic function is normal.   Right Ventricle: Right ventricular cavity size is normal. Systolic function is normal.   Atrial Septum: There is a patent foramen ovale confirmed at rest with predominant right to left shunting using saline contrast.     XR chest 1 view portable    Result Date: 3/13/2024  Narrative: XR CHEST PORTABLE INDICATION: syncope. COMPARISON: None FINDINGS: Mild benign left base atelectasis. No pneumothorax or pleural effusion. Normal cardiomediastinal silhouette. Bones are unremarkable for age. Normal upper abdomen.     Impression: No acute cardiopulmonary disease. Workstation performed: [id number]     CT head without contrast    Result Date: 3/13/2024  Narrative: CT BRAIN - WITHOUT CONTRAST INDICATION:   syncope vs new seizure. COMPARISON:  None. TECHNIQUE:  CT examination of the brain was performed.  Multiplanar 2D reformatted images were created from the source data. Radiation dose length product (DLP) for this visit:  946 mGy-cm .  This examination, like all CT scans performed in the [facility], was performed utilizing techniques to minimize radiation dose exposure, including the use of iterative reconstruction and automated exposure control. IMAGE QUALITY:  Diagnostic. FINDINGS: PARENCHYMA:  No intracranial mass, mass effect or midline shift. No CT signs of acute infarction.  No acute parenchymal hemorrhage. VENTRICLES AND EXTRA-AXIAL SPACES:  Normal for the patient's age. VISUALIZED ORBITS: Normal visualized orbits. PARANASAL SINUSES: Normal  visualized paranasal sinuses. CALVARIUM AND EXTRACRANIAL SOFT TISSUES:  Normal.     Impression: No acute intracranial hemorrhage, mass effect or edema. Workstation performed: [id number]           Counseling / Coordination of Care  Total floor / unit time spent today 45 minutes.  Greater than 50% of total time was spent with the patient and / or family counseling and / or coordination of care.  A description of the counseling / coordination of care: Review of history, current assessment, development of a plan.      Code Status: Level 1 - Full Code    ** Please Note: Dragon 360 Dictation voice to text software may have been used in the creation of this document. **

## 2024-03-16 VITALS
DIASTOLIC BLOOD PRESSURE: 81 MMHG | RESPIRATION RATE: 18 BRPM | TEMPERATURE: 98.5 F | OXYGEN SATURATION: 95 % | WEIGHT: 226 LBS | HEART RATE: 102 BPM | SYSTOLIC BLOOD PRESSURE: 128 MMHG

## 2024-03-16 LAB
ANION GAP SERPL CALCULATED.3IONS-SCNC: 7 MMOL/L (ref 4–13)
BASOPHILS # BLD AUTO: 0.07 THOUSANDS/ÂΜL (ref 0–0.1)
BASOPHILS NFR BLD AUTO: 1 % (ref 0–1)
BUN SERPL-MCNC: 17 MG/DL (ref 5–25)
CALCIUM SERPL-MCNC: 8.4 MG/DL (ref 8.4–10.2)
CHLORIDE SERPL-SCNC: 104 MMOL/L (ref 96–108)
CO2 SERPL-SCNC: 25 MMOL/L (ref 21–32)
CREAT SERPL-MCNC: 0.8 MG/DL (ref 0.6–1.3)
EOSINOPHIL # BLD AUTO: 0.18 THOUSAND/ÂΜL (ref 0–0.61)
EOSINOPHIL NFR BLD AUTO: 2 % (ref 0–6)
ERYTHROCYTE [DISTWIDTH] IN BLOOD BY AUTOMATED COUNT: 13 % (ref 11.6–15.1)
GFR SERPL CREATININE-BSD FRML MDRD: 91 ML/MIN/1.73SQ M
GLUCOSE P FAST SERPL-MCNC: 96 MG/DL (ref 65–99)
GLUCOSE SERPL-MCNC: 96 MG/DL (ref 65–140)
HCT VFR BLD AUTO: 38.5 % (ref 34.8–46.1)
HGB BLD-MCNC: 12.9 G/DL (ref 11.5–15.4)
IMM GRANULOCYTES # BLD AUTO: 0.04 THOUSAND/UL (ref 0–0.2)
IMM GRANULOCYTES NFR BLD AUTO: 0 % (ref 0–2)
LYMPHOCYTES # BLD AUTO: 2.38 THOUSANDS/ÂΜL (ref 0.6–4.47)
LYMPHOCYTES NFR BLD AUTO: 19 % (ref 14–44)
MCH RBC QN AUTO: 30.8 PG (ref 26.8–34.3)
MCHC RBC AUTO-ENTMCNC: 33.5 G/DL (ref 31.4–37.4)
MCV RBC AUTO: 92 FL (ref 82–98)
MONOCYTES # BLD AUTO: 0.99 THOUSAND/ÂΜL (ref 0.17–1.22)
MONOCYTES NFR BLD AUTO: 8 % (ref 4–12)
NEUTROPHILS # BLD AUTO: 8.65 THOUSANDS/ÂΜL (ref 1.85–7.62)
NEUTS SEG NFR BLD AUTO: 70 % (ref 43–75)
NRBC BLD AUTO-RTO: 0 /100 WBCS
PLATELET # BLD AUTO: 262 THOUSANDS/UL (ref 149–390)
PMV BLD AUTO: 9.1 FL (ref 8.9–12.7)
POTASSIUM SERPL-SCNC: 3.8 MMOL/L (ref 3.5–5.3)
RBC # BLD AUTO: 4.19 MILLION/UL (ref 3.81–5.12)
SODIUM SERPL-SCNC: 136 MMOL/L (ref 135–147)
WBC # BLD AUTO: 12.31 THOUSAND/UL (ref 4.31–10.16)

## 2024-03-16 PROCEDURE — 85025 COMPLETE CBC W/AUTO DIFF WBC: CPT

## 2024-03-16 PROCEDURE — 99239 HOSP IP/OBS DSCHRG MGMT >30: CPT | Performed by: INTERNAL MEDICINE

## 2024-03-16 PROCEDURE — 80048 BASIC METABOLIC PNL TOTAL CA: CPT

## 2024-03-16 NOTE — PLAN OF CARE
Problem: PAIN - ADULT  Goal: Verbalizes/displays adequate comfort level or baseline comfort level  Description: Interventions:  - Encourage patient to monitor pain and request assistance  - Assess pain using appropriate pain scale  - Administer analgesics based on type and severity of pain and evaluate response  - Implement non-pharmacological measures as appropriate and evaluate response  - Consider cultural and social influences on pain and pain management  - Notify physician/advanced practitioner if interventions unsuccessful or patient reports new pain  3/16/2024 0032 by Kelley Mcginnis RN  Outcome: Progressing  3/16/2024 0032 by Kelley Mcginnis RN  Outcome: Progressing     Problem: SAFETY ADULT  Goal: Patient will remain free of falls  Description: INTERVENTIONS:  - Educate patient/family on patient safety including physical limitations  - Instruct patient to call for assistance with activity   - Consult OT/PT to assist with strengthening/mobility   - Keep Call bell within reach  - Keep bed low and locked with side rails adjusted as appropriate  - Keep care items and personal belongings within reach  - Initiate and maintain comfort rounds  - Make Fall Risk Sign visible to staff  - Apply yellow socks and bracelet for high fall risk patients  - Consider moving patient to room near nurses station  3/16/2024 0032 by Kelley Mcginnis RN  Outcome: Progressing  3/16/2024 0032 by Kelley Mcginnis RN  Outcome: Progressing

## 2024-03-16 NOTE — ASSESSMENT & PLAN NOTE
Likely orthostatic versus neurocardiogenic syncope, but cannot rule out seizure activity at this time.   No evidence of tongue bite, no evidence of bladder or bowel incontinence.  No history of seizure disorder  No prodromal symptoms.   CT head with no abnormality.  EKG with no evidence of ST-T wave ischemic changes, no bradycardia or tachyarrhythmia.  Orthostatic vitals negative  Patient currently at baseline on exam  ECHO with EF of 55%, notable for PFO  EEG normal  MRI without acute findings    Plan:  Consult cardiology - not felt to have a cardiac etiology  Consult neuro - plan for outpatient follow up, seizure suspected  - [employer] form completed and submitted online

## 2024-03-16 NOTE — UTILIZATION REVIEW
Continued Stay Review    Date: 3/16                          Current Patient Class: OBS  Current Level of Care: MS     HPI:39 y.o. female initially admitted on 3/13 with syncope, seizure like activity .     Assessment/Plan: 3/16    Non focal neuro . MRI 3/15 shows nothing acute, nothing to account for reported seizure. EEG 3/15 was normal .     Vital Signs:   Date/Time Temp Pulse Resp BP MAP (mmHg) SpO2   03/15/24 2100 -- -- -- -- -- 93 %   03/15/24 20:53:17 98.9 °F (37.2 °C) 101 18 120/82 95 94 %   03/15/24 15:26:13 98.5 °F (36.9 °C) 82 18 120/70 87 94 %     Date and Time Eye Opening Best Verbal Response Best Motor Response Alberto Coma Scale Score   03/15/24 2100 4 5 6 15   03/15/24 0850 4 5 6 15   03/14/24 2100 4 5 6 15     Pertinent Labs/Diagnostic Results:   3/15   MRI brain-seizure   Mildly motion degraded examination.   No acute intracranial abnormality or pathologic intracranial enhancement.   No imaging findings to account for the patient's reported seizure.  EEG- This Routine EEG recorded during wakefulness and drowsiness is normal. A normal EEG does not exclude the possibility of epilepsy. If clinically warranted, a repeat EEG following sleep deprivation could be considered.         Results from last 7 days   Lab Units 03/13/24 2004   SARS-COV-2  Negative     Results from last 7 days   Lab Units 03/16/24 0450 03/15/24  0615 03/14/24  0642 03/13/24 2004   WBC Thousand/uL 12.31* 7.78 9.41 12.21*   HEMOGLOBIN g/dL 12.9 14.2 12.6 13.6   HEMATOCRIT % 38.5 43.1 37.6 40.5   PLATELETS Thousands/uL 262 280 261 304   NEUTROS ABS Thousands/µL 8.65* 3.01 5.24 8.89*         Results from last 7 days   Lab Units 03/16/24  0450 03/15/24  0615 03/13/24 2004   SODIUM mmol/L 136 139 138   POTASSIUM mmol/L 3.8 3.9 3.8   CHLORIDE mmol/L 104 107 105   CO2 mmol/L 25 26 24   ANION GAP mmol/L 7 6 9   BUN mg/dL 17 16 14   CREATININE mg/dL 0.80 0.82 0.89   EGFR ml/min/1.73sq m 91 89 80   CALCIUM mg/dL 8.4 8.6 9.0     Results  "from last 7 days   Lab Units 03/13/24 2004   AST U/L 21   ALT U/L 20   ALK PHOS U/L 67   TOTAL PROTEIN g/dL 7.5   ALBUMIN g/dL 4.1   TOTAL BILIRUBIN mg/dL 0.64         Results from last 7 days   Lab Units 03/16/24  0450 03/15/24  0615 03/13/24 2004   GLUCOSE RANDOM mg/dL 96 93 105             No results found for: \"BETA-HYDROXYBUTYRATE\"                   Results from last 7 days   Lab Units 03/14/24  0020 03/13/24  2203 03/13/24 2004   HS TNI 0HR ng/L  --   --  24   HS TNI 2HR ng/L  --  35  --    HSTNI D2 ng/L  --  11  --    HS TNI 4HR ng/L 31  --   --    HSTNI D4 ng/L 7  --   --      Results from last 7 days   Lab Units 03/13/24 2004   D-DIMER QUANTITATIVE ug/ml FEU 0.41                                                                 Results from last 7 days   Lab Units 03/13/24 2203   CLARITY UA  Clear   COLOR UA  Yellow   SPEC GRAV UA  1.024   PH UA  5.5   GLUCOSE UA mg/dl Negative   KETONES UA mg/dl Trace*   BLOOD UA  Negative   PROTEIN UA mg/dl 30 (1+)*   NITRITE UA  Negative   BILIRUBIN UA  Negative   UROBILINOGEN UA (BE) mg/dl <2.0   LEUKOCYTES UA  Negative   WBC UA /hpf 2-4*   RBC UA /hpf 1-2   BACTERIA UA /hpf None Seen   EPITHELIAL CELLS WET PREP /hpf Occasional   MUCUS THREADS  Occasional*     Results from last 7 days   Lab Units 03/13/24 2004   INFLUENZA A PCR  Negative   INFLUENZA B PCR  Negative   RSV PCR  Negative                 Medications:   Scheduled Medications:  ALPRAZolam, 0.5 mg, Oral, HS  pantoprazole, 40 mg, Oral, HS  polyethylene glycol, 17 g, Oral, Daily  sertraline, 50 mg, Oral, HS      Continuous IV Infusions:     PRN Meds:  acetaminophen, 650 mg, Oral, Q6H PRN  ibuprofen, 400 mg, Oral, Q6H PRN  LORazepam, 2 mg, Intravenous, Q6H PRN  ondansetron, 4 mg, Intravenous, Q6H PRN  SUMAtriptan, 50 mg, Oral, Once PRN        Discharge Plan: D    Network Utilization Review Department  ATTENTION: Please call with any questions or concerns to [phone] and carefully listen to the prompts " so that you are directed to the right person. All voicemails are confidential.   For Discharge needs, contact Care Management DC Support Team at [phone] opt. 2  Send all requests for admission clinical reviews, approved or denied determinations and any other requests to dedicated fax number below belonging to the campus where the patient is receiving treatment. List of dedicated fax numbers for the Facilities:  FACILITY NAME UR FAX NUMBER   ADMISSION DENIALS (Administrative/Medical Necessity) [phone]   DISCHARGE SUPPORT TEAM (NETWORK) [phone]   PARENT CHILD HEALTH (Maternity/NICU/Pediatrics) [phone]   [facility] [phone]   [facility] [phone]   [facility] [phone]   [facility] [phone]   [facility] [phone]   [facility] [phone]   [facility] [phone]   [facility] [phone]   [facility] [phone]   [facility] [phone]   [facility] [phone]   [facility] [phone]

## 2024-03-16 NOTE — ASSESSMENT & PLAN NOTE
Resolved, unknown etiology.  Recent Labs     03/14/24  0642 03/15/24  0615 03/16/24  0450   WBC 9.41 7.78 12.31*       Patient remains afebrile.   No signs of active infections noted on physical exam.   Will monitor.

## 2024-03-20 ENCOUNTER — TELEPHONE (OUTPATIENT)
Dept: NEUROLOGY | Facility: CLINIC | Age: 39
End: 2024-03-20

## 2024-03-20 NOTE — TELEPHONE ENCOUNTER
Hello,     Can you please advise which Speciality/Team and if patient can be seen by an Resident, AP and or Attending  only?    Thank you for your time,     Pily

## 2024-03-20 NOTE — TELEPHONE ENCOUNTER
HFU/ SLAN/ Syncope      DC- Home- 3/16/24      Please provide HFU instructions. Will it be with attending/ AP and what time frame?

## 2024-03-21 NOTE — TELEPHONE ENCOUNTER
Patient called to schedule HFU    Patient was scheduled with Dr Jarvis on 4/30/24 at 10:30am.    Patient was placed on wait list for sooner appointment.

## 2024-04-26 ENCOUNTER — TELEPHONE (OUTPATIENT)
Dept: NEUROLOGY | Facility: CLINIC | Age: 39
End: 2024-04-26

## 2024-04-30 ENCOUNTER — OFFICE VISIT (OUTPATIENT)
Dept: NEUROLOGY | Facility: CLINIC | Age: 39
End: 2024-04-30
Payer: COMMERCIAL

## 2024-04-30 VITALS
HEART RATE: 92 BPM | WEIGHT: 213.5 LBS | TEMPERATURE: 98.1 F | HEIGHT: 63 IN | SYSTOLIC BLOOD PRESSURE: 134 MMHG | BODY MASS INDEX: 37.83 KG/M2 | DIASTOLIC BLOOD PRESSURE: 63 MMHG

## 2024-04-30 DIAGNOSIS — R55 SYNCOPE, UNSPECIFIED SYNCOPE TYPE: Primary | ICD-10-CM

## 2024-04-30 DIAGNOSIS — R56.9 SEIZURE-LIKE ACTIVITY (HCC): ICD-10-CM

## 2024-04-30 PROBLEM — K21.9 GERD (GASTROESOPHAGEAL REFLUX DISEASE): Status: ACTIVE | Noted: 2024-04-30

## 2024-04-30 PROCEDURE — G2212 PROLONG OUTPT/OFFICE VIS: HCPCS | Performed by: PSYCHIATRY & NEUROLOGY

## 2024-04-30 PROCEDURE — 99215 OFFICE O/P EST HI 40 MIN: CPT | Performed by: PSYCHIATRY & NEUROLOGY

## 2024-04-30 NOTE — LETTER
2024    To whom it may concern,    Nenita Lewis ( 1985) is under my neurologic care. She is cleared from a neurologic perspective to return to work, but should avoid operating machinery, being at unprotected heights, and avoid open blades/flames.     Sincerely,     Joshua Jarvis MD

## 2024-04-30 NOTE — PATIENT INSTRUCTIONS
-- please get a sleep deprived EEG before your next appointment. If this is normal, I would recommend getting a third EEG.     -- Please limit your caffeine intake and assure your are well hydrated.     -- I would recommend following up with your PCP to look for other potential causes of syncope, including considering longer heart rhythm monitoring.

## 2024-04-30 NOTE — PROGRESS NOTES
Patient ID: Nenita Lewis is a 39 y.o. female with a single event of loss of consciousness, who is presenting to Neurology office for evaluation of her event of loss of consciousness.       Assessment/Plan:    Syncope  Overall, the etiology of her event is not clear.  She did not have any clear prodrome that would be helpful in determining the cause of her event.  Although she did have some shaking, this can also be seen with convulsive syncope.  She does not have any significant risk factors for seizures, and her neurologic exam was normal.  Additionally recent MRI and EEGs were normal.    At this point, I would recommend keeping her differential broad and further assess for the possibility of epileptic seizures, but also get some additional testing with her PCP to look for possible cardiac causes of her event.    --I will have her get a sleep deprived EEG, and if this is normal likely have her get a third EEG.    --I would recommend she follow back up with her PCP for additional cardiac evaluation for her episodes of syncope, with consideration of seeing cardiology    --She does drink an excessive amount of caffeine, typically drinking 2-3 energy drinks per day in addition to coffee.  I did recommend she decrease her caffeine intake, since this could have contributed to her episode of loss of consciousness.    --We did discuss driving restrictions in [place], and that I would recommend she not drive for 6 months from her event of loss of consciousness since the etiology is not clear and this may have been a seizure.  I did fill out forms for [employer] today    I spent a total of 55 min with the patient and completing documentation on the day of the encounter. This time was spent specifically discussing her diagnosis, medications, and plan as detailed above     She will Return in about 4 months (around 8/30/2024).      Subjective:    HPI  Current medications as per Epic    Briefly reviewing her history, she had  an event concerning for seizure on 3/13/2024. She was making dinner and she just remembers waking up with EMS around her. She denies any prodrome prior to the event. Her 12 yo son reported that he heard a bang and noted that she had fallen, lost consciousness, her eyes were flickering and she had some shaking. He called 911 and EMS arrived. She was taken to the hospital and she had an MRI, an EEG, transthoracic echocardiogram and EKG, which were all unremarkable.     She denies anything different around the time of the event.     She hasn't had any events since March. When she was about 1 yo, she had febrile seizures, but her mother reports that she also would have some staring episodes concerning for seizures. She had an MRI and EEGs at the time, with mother reporting that these tests all came back normal. She was treated with Phenobarbital for about 4 years and then came off the medications and did well since then.     She works nightshift doing overnight replenishment for [employer]    She drinks one cup of coffee per day, but does also drink 2-3 cans of energy drinks per day.     Special Features  Status epilepticus: no  Self Injury Seizures: no  Precipitating Factors: no    Epilepsy Risk Factors:  Abnormal pregnancy:    no  Abnormal birth/:   no  Abnormal Development:   no  Febrile seizures, simple:   yes  Febrile seizures, complex:   no  CNS infection:    no  Intellectual disability:    no  Cerebral palsy:    no  Head injury (moderate/severe):  no  CNS neoplasm:    no  CNS malformation:    no  Neurosurgical procedure:   no  Stroke:     no  Alcohol abuse:    no  Drug abuse:     no  Family history Sz/epilepsy:   no      Prior AEDs:  Phenobarbital as a child    Prior Evaluation:  - MRI brain: 3/15/2024: motion degraded, but no abnormality seen.   - Routine EEG: 3/15/2024: normal  - Ambulatory EEG: none  - Video EEG: none  - PET scan brain : none  - Neuropsychologic testing: none    Psychiatric  "History:  Depression: yes  Anxiety: yes  Psychosis: no  Psychiatric Admissions: no    Her history was also obtained from her mother, who was present at today's visit.       I reviewed prior notes including prior hospitalization notes and testing, as documented in [employer], and summarized above.     The following portions of the patient's history were reviewed and updated as appropriate: allergies, current medications, past family history, past medical history, past social history, past surgical history, and problem list.     Objective:    Blood pressure 134/63, pulse 92, temperature 98.1 °F (36.7 °C), temperature source Temporal, height 5' 3\" (1.6 m), weight 96.8 kg (213 lb 8 oz).    Physical Exam    Neurological Exam  GENERAL EXAMINATION:   In general patient is well appearing and in no distress.  There is no peripheral edema.    NEUROLOGIC EXAMINATION:     Alert and oriented to person, date, location. Fund of knowledge is full with good understanding of medical situation.  Recent and remote memory were intact    Mood and affect are appropriate. Attention is intact.    Language function including fluency, naming, and comprehension intact.    Cranial nerves: Pupils are equal round reactive to light and accommodation.  Visual Fields are full to confrontation bilaterally.  Extraocular movements are intact without nystagmus. Facial sensation is intact to light touch. No facial droop, face activates symmetrically. There is no dysarthria. Hearing was intact to finger rub. Tongue and uvula are midline and palate elevates symmetrically.  Shoulder shrug  5/5.    Motor Exam:  No pronator drift. Bulk and tone are normal. Strength is 5/5 throughout.    Deep tendon reflexes: Biceps 2+, brachioradialis 2+, patellar 2+, Achilles 2+ bilaterally.     Sensation: Intact light touch    Coordination: Finger nose finger and heel to shin testing are without dysmetria.     Gait: Negative romberg. Normal casual gait. "     ROS:    Review of Systems   Constitutional:  Negative for appetite change, fatigue and fever.   HENT: Negative.  Negative for hearing loss, tinnitus, trouble swallowing and voice change.    Eyes: Negative.  Negative for photophobia, pain and visual disturbance.   Respiratory: Negative.  Negative for shortness of breath.    Cardiovascular: Negative.  Negative for palpitations.   Gastrointestinal: Negative.  Negative for nausea and vomiting.   Endocrine: Negative.  Negative for cold intolerance.   Genitourinary: Negative.  Negative for dysuria, frequency and urgency.   Musculoskeletal:  Negative for back pain, gait problem, myalgias, neck pain and neck stiffness.   Skin: Negative.  Negative for rash.   Allergic/Immunologic: Negative.    Neurological:  Positive for syncope. Negative for dizziness, tremors, seizures, facial asymmetry, speech difficulty, weakness, light-headedness, numbness and headaches.        Pt reports episode of passing out with no memory of what happened.    Hematological: Negative.  Does not bruise/bleed easily.   Psychiatric/Behavioral: Negative.  Negative for confusion, hallucinations and sleep disturbance.    All other systems reviewed and are negative.        I personally reviewed the ROS that was entered by the medical assistant    Voice recognition software was used in the generation of this note. There may be unintentional errors including grammatical errors, spelling errors, or pronoun errors.

## 2024-05-06 NOTE — ASSESSMENT & PLAN NOTE
Overall, the etiology of her event is not clear.  She did not have any clear prodrome that would be helpful in determining the cause of her event.  Although she did have some shaking, this can also be seen with convulsive syncope.  She does not have any significant risk factors for seizures, and her neurologic exam was normal.  Additionally recent MRI and EEGs were normal.    At this point, I would recommend keeping her differential broad and further assess for the possibility of epileptic seizures, but also get some additional testing with her PCP to look for possible cardiac causes of her event.    --I will have her get a sleep deprived EEG, and if this is normal likely have her get a third EEG.    --I would recommend she follow back up with her PCP for additional cardiac evaluation for her episodes of syncope, with consideration of seeing cardiology    --She does drink an excessive amount of caffeine, typically drinking 2-3 energy drinks per day in addition to coffee.  I did recommend she decrease her caffeine intake, since this could have contributed to her episode of loss of consciousness.    --We did discuss driving restrictions in [place], and that I would recommend she not drive for 6 months from her event of loss of consciousness since the etiology is not clear and this may have been a seizure.  I did fill out forms for [employer] today

## 2024-05-28 ENCOUNTER — HOSPITAL ENCOUNTER (OUTPATIENT)
Dept: NEUROLOGY | Facility: CLINIC | Age: 39
Discharge: HOME | End: 2024-05-28
Payer: COMMERCIAL

## 2024-05-28 DIAGNOSIS — R55 SYNCOPE, UNSPECIFIED SYNCOPE TYPE: ICD-10-CM

## 2024-05-28 PROCEDURE — 95819 EEG AWAKE AND ASLEEP: CPT

## 2024-05-28 PROCEDURE — 95819 EEG AWAKE AND ASLEEP: CPT | Performed by: STUDENT IN AN ORGANIZED HEALTH CARE EDUCATION/TRAINING PROGRAM

## 2024-06-07 ENCOUNTER — TELEPHONE (OUTPATIENT)
Dept: NEUROLOGY | Facility: CLINIC | Age: 39
End: 2024-06-07

## 2024-06-07 ENCOUNTER — PATIENT MESSAGE (OUTPATIENT)
Dept: NEUROLOGY | Facility: CLINIC | Age: 39
End: 2024-06-07

## 2024-06-07 NOTE — TELEPHONE ENCOUNTER
First Attempt:  LM asking pt to please call the office as her appt on 10/9 with  needs to be R/S.    My chart message also sent.

## 2024-06-11 ENCOUNTER — TELEPHONE (OUTPATIENT)
Dept: NEUROLOGY | Facility: CLINIC | Age: 39
End: 2024-06-11

## 2024-06-11 DIAGNOSIS — R55 SYNCOPE AND COLLAPSE: Primary | ICD-10-CM

## 2024-06-11 NOTE — TELEPHONE ENCOUNTER
----- Message from Joshua Jarvis MD sent at 6/11/2024  2:25 PM EDT -----  Please call patient about her recent EEG. This was normal, which is good. As we discussed in the office, I would like to get one last routine EEG (does not need to be sleep deprived). This helps give another sample of her brainwaves to help assure we aren't missing an abnormality.    N/A

## 2024-07-11 ENCOUNTER — TELEPHONE (OUTPATIENT)
Age: 39
End: 2024-07-11

## 2024-07-11 NOTE — TELEPHONE ENCOUNTER
Patient called in to reschedule their appt that was cancelled on 10/9/24 with Dr. Jarvis in [facility].   I was unable to locate anything within the LOV note Return in about 4 months (around 8/30/2024).   Can you please assist in rescheduling?  Patient is requesting a call back.   Please assist.   Phone # [phone]   Thank you.

## 2024-08-05 ENCOUNTER — HOSPITAL ENCOUNTER (OUTPATIENT)
Dept: NEUROLOGY | Facility: CLINIC | Age: 39
Discharge: HOME | End: 2024-08-05
Payer: COMMERCIAL

## 2024-08-05 DIAGNOSIS — R55 SYNCOPE AND COLLAPSE: ICD-10-CM

## 2024-08-05 PROCEDURE — 95816 EEG AWAKE AND DROWSY: CPT | Performed by: PSYCHIATRY & NEUROLOGY

## 2024-08-05 PROCEDURE — 95816 EEG AWAKE AND DROWSY: CPT
